# Patient Record
Sex: MALE | Race: WHITE | NOT HISPANIC OR LATINO | Employment: OTHER | URBAN - METROPOLITAN AREA
[De-identification: names, ages, dates, MRNs, and addresses within clinical notes are randomized per-mention and may not be internally consistent; named-entity substitution may affect disease eponyms.]

---

## 2023-02-08 PROBLEM — I65.29 CAROTID ATHEROSCLEROSIS: Status: ACTIVE | Noted: 2023-02-08

## 2023-02-08 PROBLEM — I63.9 THROMBOEMBOLIC STROKE (MULTI): Status: ACTIVE | Noted: 2023-02-08

## 2023-02-08 PROBLEM — T84.093A FAILED TOTAL LEFT KNEE REPLACEMENT (CMS-HCC): Status: ACTIVE | Noted: 2023-02-08

## 2023-02-08 PROBLEM — M54.9 CHRONIC BACK PAIN GREATER THAN 3 MONTHS DURATION: Status: ACTIVE | Noted: 2023-02-08

## 2023-02-08 PROBLEM — Q21.12 PFO (PATENT FORAMEN OVALE) (HHS-HCC): Status: ACTIVE | Noted: 2023-02-08

## 2023-02-08 PROBLEM — I10 ESSENTIAL HYPERTENSION: Status: ACTIVE | Noted: 2023-02-08

## 2023-02-08 PROBLEM — M51.9 LUMBOSACRAL DISC DISEASE: Status: ACTIVE | Noted: 2023-02-08

## 2023-02-08 PROBLEM — S39.012A STRAIN OF BACK: Status: ACTIVE | Noted: 2023-02-08

## 2023-02-08 PROBLEM — R97.20 ELEVATED PSA: Status: ACTIVE | Noted: 2023-02-08

## 2023-02-08 PROBLEM — M47.12 CERVICAL SPONDYLOSIS WITH MYELOPATHY: Status: ACTIVE | Noted: 2023-02-08

## 2023-02-08 PROBLEM — E65 CENTRAL OBESITY: Status: ACTIVE | Noted: 2023-02-08

## 2023-02-08 PROBLEM — H53.9 VISUAL DISTURBANCE: Status: ACTIVE | Noted: 2023-02-08

## 2023-02-08 PROBLEM — L73.9 ACUTE FOLLICULITIS: Status: ACTIVE | Noted: 2023-02-08

## 2023-02-08 PROBLEM — R42 INTERMITTENT LIGHTHEADEDNESS: Status: ACTIVE | Noted: 2023-02-08

## 2023-02-08 PROBLEM — G89.29 CHRONIC BACK PAIN GREATER THAN 3 MONTHS DURATION: Status: ACTIVE | Noted: 2023-02-08

## 2023-02-08 PROBLEM — H60.91 OTITIS EXTERNA OF RIGHT EAR: Status: ACTIVE | Noted: 2023-02-08

## 2023-02-08 PROBLEM — R00.2 PALPITATIONS: Status: ACTIVE | Noted: 2023-02-08

## 2023-02-08 PROBLEM — E55.9 VITAMIN D DEFICIENCY: Status: ACTIVE | Noted: 2023-02-08

## 2023-02-08 PROBLEM — E78.5 DYSLIPIDEMIA: Status: ACTIVE | Noted: 2023-02-08

## 2023-02-08 RX ORDER — CLOPIDOGREL BISULFATE 75 MG/1
1 TABLET ORAL DAILY
COMMUNITY
Start: 2021-11-12

## 2023-02-08 RX ORDER — NAPROXEN SODIUM 220 MG/1
1 TABLET, FILM COATED ORAL DAILY
COMMUNITY
Start: 2021-11-12 | End: 2024-03-20

## 2023-02-08 RX ORDER — MUPIROCIN 20 MG/G
OINTMENT TOPICAL 3 TIMES DAILY
COMMUNITY
Start: 2022-07-11

## 2023-02-08 RX ORDER — MECLIZINE HCL 12.5 MG 12.5 MG/1
1 TABLET ORAL
COMMUNITY
Start: 2022-07-11

## 2023-02-08 RX ORDER — ATORVASTATIN CALCIUM 80 MG/1
1 TABLET, FILM COATED ORAL NIGHTLY
COMMUNITY
Start: 2021-11-12 | End: 2023-12-20

## 2023-02-08 RX ORDER — TIZANIDINE 4 MG/1
TABLET ORAL EVERY 6 HOURS PRN
COMMUNITY
Start: 2022-06-14

## 2023-02-08 RX ORDER — CIPROFLOXACIN AND DEXAMETHASONE 3; 1 MG/ML; MG/ML
SUSPENSION/ DROPS AURICULAR (OTIC)
COMMUNITY
Start: 2022-06-14

## 2023-02-08 RX ORDER — VALSARTAN 80 MG/1
1 TABLET ORAL DAILY
COMMUNITY
Start: 2021-11-12 | End: 2023-03-15

## 2023-02-08 RX ORDER — PANTOPRAZOLE SODIUM 40 MG/1
TABLET, DELAYED RELEASE ORAL
COMMUNITY
Start: 2021-12-30

## 2023-02-08 RX ORDER — HYDROCODONE BITARTRATE AND ACETAMINOPHEN 5; 325 MG/1; MG/1
1 TABLET ORAL 2 TIMES DAILY
COMMUNITY

## 2023-02-08 RX ORDER — BUTALBITAL, ACETAMINOPHEN AND CAFFEINE 50; 325; 40 MG/1; MG/1; MG/1
1 TABLET ORAL EVERY 6 HOURS PRN
COMMUNITY
Start: 2013-04-24 | End: 2023-04-03 | Stop reason: SDUPTHER

## 2023-03-15 DIAGNOSIS — I10 ESSENTIAL (PRIMARY) HYPERTENSION: ICD-10-CM

## 2023-03-15 RX ORDER — VALSARTAN 80 MG/1
TABLET ORAL
Qty: 90 TABLET | Refills: 3 | Status: SHIPPED | OUTPATIENT
Start: 2023-03-15 | End: 2024-03-20

## 2023-04-01 RX ORDER — OXYCODONE HYDROCHLORIDE 5 MG/1
TABLET ORAL
COMMUNITY
Start: 2023-01-21

## 2023-04-01 RX ORDER — TRAMADOL HYDROCHLORIDE 50 MG/1
1 TABLET ORAL 2 TIMES DAILY
COMMUNITY
Start: 2023-03-03

## 2023-04-01 RX ORDER — TAMSULOSIN HYDROCHLORIDE 0.4 MG/1
CAPSULE ORAL
COMMUNITY
Start: 2023-03-08

## 2023-04-02 PROBLEM — E66.3 OVERWEIGHT WITH BODY MASS INDEX (BMI) OF 28 TO 28.9 IN ADULT: Status: ACTIVE | Noted: 2023-04-02

## 2023-04-03 ENCOUNTER — OFFICE VISIT (OUTPATIENT)
Dept: PRIMARY CARE | Facility: CLINIC | Age: 63
End: 2023-04-03
Payer: COMMERCIAL

## 2023-04-03 VITALS
HEART RATE: 68 BPM | HEIGHT: 72 IN | RESPIRATION RATE: 18 BRPM | BODY MASS INDEX: 29.66 KG/M2 | SYSTOLIC BLOOD PRESSURE: 140 MMHG | DIASTOLIC BLOOD PRESSURE: 82 MMHG | TEMPERATURE: 97.6 F | WEIGHT: 219 LBS | OXYGEN SATURATION: 97 %

## 2023-04-03 DIAGNOSIS — M54.9 CHRONIC BACK PAIN GREATER THAN 3 MONTHS DURATION: ICD-10-CM

## 2023-04-03 DIAGNOSIS — G43.001 MIGRAINE WITHOUT AURA AND WITH STATUS MIGRAINOSUS, NOT INTRACTABLE: Primary | ICD-10-CM

## 2023-04-03 DIAGNOSIS — G89.29 CHRONIC BACK PAIN GREATER THAN 3 MONTHS DURATION: ICD-10-CM

## 2023-04-03 DIAGNOSIS — E78.5 DYSLIPIDEMIA: ICD-10-CM

## 2023-04-03 DIAGNOSIS — I63.342 CEREBROVASCULAR ACCIDENT (CVA) DUE TO THROMBOSIS OF LEFT CEREBELLAR ARTERY (MULTI): ICD-10-CM

## 2023-04-03 DIAGNOSIS — I10 ESSENTIAL HYPERTENSION: ICD-10-CM

## 2023-04-03 LAB
ALANINE AMINOTRANSFERASE (SGPT) (U/L) IN SER/PLAS: 30 U/L (ref 10–52)
ALBUMIN (G/DL) IN SER/PLAS: 4.5 G/DL (ref 3.4–5)
ALKALINE PHOSPHATASE (U/L) IN SER/PLAS: 89 U/L (ref 33–136)
ANION GAP IN SER/PLAS: 11 MMOL/L (ref 10–20)
ASPARTATE AMINOTRANSFERASE (SGOT) (U/L) IN SER/PLAS: 22 U/L (ref 9–39)
BILIRUBIN TOTAL (MG/DL) IN SER/PLAS: 0.6 MG/DL (ref 0–1.2)
CALCIUM (MG/DL) IN SER/PLAS: 9.6 MG/DL (ref 8.6–10.6)
CARBON DIOXIDE, TOTAL (MMOL/L) IN SER/PLAS: 29 MMOL/L (ref 21–32)
CHLORIDE (MMOL/L) IN SER/PLAS: 105 MMOL/L (ref 98–107)
CHOLESTEROL (MG/DL) IN SER/PLAS: 128 MG/DL (ref 0–199)
CHOLESTEROL IN HDL (MG/DL) IN SER/PLAS: 47.4 MG/DL
CHOLESTEROL/HDL RATIO: 2.7
CREATININE (MG/DL) IN SER/PLAS: 0.89 MG/DL (ref 0.5–1.3)
ERYTHROCYTE DISTRIBUTION WIDTH (RATIO) BY AUTOMATED COUNT: 14.9 % (ref 11.5–14.5)
ERYTHROCYTE MEAN CORPUSCULAR HEMOGLOBIN CONCENTRATION (G/DL) BY AUTOMATED: 31.7 G/DL (ref 32–36)
ERYTHROCYTE MEAN CORPUSCULAR VOLUME (FL) BY AUTOMATED COUNT: 88 FL (ref 80–100)
ERYTHROCYTES (10*6/UL) IN BLOOD BY AUTOMATED COUNT: 4.81 X10E12/L (ref 4.5–5.9)
GFR MALE: >90 ML/MIN/1.73M2
GLUCOSE (MG/DL) IN SER/PLAS: 135 MG/DL (ref 74–99)
HEMATOCRIT (%) IN BLOOD BY AUTOMATED COUNT: 42.3 % (ref 41–52)
HEMOGLOBIN (G/DL) IN BLOOD: 13.4 G/DL (ref 13.5–17.5)
LDL: 51 MG/DL (ref 0–99)
LEUKOCYTES (10*3/UL) IN BLOOD BY AUTOMATED COUNT: 5.5 X10E9/L (ref 4.4–11.3)
NRBC (PER 100 WBCS) BY AUTOMATED COUNT: 0 /100 WBC (ref 0–0)
PLATELETS (10*3/UL) IN BLOOD AUTOMATED COUNT: 174 X10E9/L (ref 150–450)
POTASSIUM (MMOL/L) IN SER/PLAS: 4.5 MMOL/L (ref 3.5–5.3)
PROSTATE SPECIFIC ANTIGEN,SCREEN: 1.11 NG/ML (ref 0–4)
PROTEIN TOTAL: 7 G/DL (ref 6.4–8.2)
SODIUM (MMOL/L) IN SER/PLAS: 140 MMOL/L (ref 136–145)
THYROTROPIN (MIU/L) IN SER/PLAS BY DETECTION LIMIT <= 0.05 MIU/L: 2.21 MIU/L (ref 0.44–3.98)
TRIGLYCERIDE (MG/DL) IN SER/PLAS: 146 MG/DL (ref 0–149)
UREA NITROGEN (MG/DL) IN SER/PLAS: 17 MG/DL (ref 6–23)
VLDL: 29 MG/DL (ref 0–40)

## 2023-04-03 PROCEDURE — 80061 LIPID PANEL: CPT

## 2023-04-03 PROCEDURE — 1036F TOBACCO NON-USER: CPT | Performed by: FAMILY MEDICINE

## 2023-04-03 PROCEDURE — 85027 COMPLETE CBC AUTOMATED: CPT

## 2023-04-03 PROCEDURE — 84153 ASSAY OF PSA TOTAL: CPT

## 2023-04-03 PROCEDURE — 99214 OFFICE O/P EST MOD 30 MIN: CPT | Performed by: FAMILY MEDICINE

## 2023-04-03 PROCEDURE — 3077F SYST BP >= 140 MM HG: CPT | Performed by: FAMILY MEDICINE

## 2023-04-03 PROCEDURE — 80053 COMPREHEN METABOLIC PANEL: CPT

## 2023-04-03 PROCEDURE — 36415 COLL VENOUS BLD VENIPUNCTURE: CPT | Performed by: FAMILY MEDICINE

## 2023-04-03 PROCEDURE — 3079F DIAST BP 80-89 MM HG: CPT | Performed by: FAMILY MEDICINE

## 2023-04-03 PROCEDURE — 84443 ASSAY THYROID STIM HORMONE: CPT

## 2023-04-03 RX ORDER — BUTALBITAL, ACETAMINOPHEN AND CAFFEINE 50; 325; 40 MG/1; MG/1; MG/1
1 TABLET ORAL EVERY 6 HOURS PRN
Qty: 90 TABLET | Refills: 3 | Status: SHIPPED | OUTPATIENT
Start: 2023-04-03 | End: 2023-07-02

## 2023-04-03 ASSESSMENT — PAIN SCALES - GENERAL: PAINLEVEL: 6

## 2023-04-03 NOTE — PROGRESS NOTES
Subjective   Martin Benson is a 63 y.o. male who presents for Follow-up.  HPI  ; patient is a 63-year-old male who is being evaluated today for recheck on blood work, reevaluation of his migraine headaches and he also had a repeat surgery on his left total knee in order to replace a plastic sleeve that had broken in his total knee replacement.  Patient had his knee replaced probably 5 or 6 years ago and there was a plastic sleeve that had eroded and deteriorated and it had to be replaced in January.  He had surgery on January 9 and he does have good range of motion in his left knee but he states that it still is painful and he does go to pain management.  Patient also recently saw urology and was prescribed Flomax for his decreased urination.  He also does have some rectal bleeding from his hemorrhoids.  Patient was hoping to get a refill on his Fioricet for migraines and I will review his OARRS report, and he will sign the contract for e- consent on the computer.  Patient states he is officially retired from work and he did get his disability since he had a small stroke about 3 years ago.      Objective ROS ; 10 systems were reviewed and the information is included in the HPI and no additional review of systems is indicated.    Physical Exam  Vitals and nursing note reviewed.   Constitutional:       Appearance: Normal appearance. He is obese.      Comments: Patient has gained about 20 pounds since he retired but he is alert and oriented.  He is trying to diet and watch his weight.   HENT:      Head: Normocephalic.      Right Ear: Tympanic membrane and external ear normal.      Left Ear: Tympanic membrane and external ear normal.      Nose: Nose normal.      Mouth/Throat:      Mouth: Mucous membranes are moist.      Pharynx: Oropharynx is clear.   Eyes:      Extraocular Movements: Extraocular movements intact.      Conjunctiva/sclera: Conjunctivae normal.      Pupils: Pupils are equal, round, and reactive to  light.   Neck:      Comments: Patient has restriction to range of motion testing cervical spine due to DJD and spasm.  Cardiovascular:      Rate and Rhythm: Normal rate and regular rhythm.      Pulses: Normal pulses.      Heart sounds: Normal heart sounds.      Comments: Patient had surgery last year for a patent foraminal ovale and has no problems at this time.  Heart rhythm is stable S1 and S2 are noted, no ectopics.  Pulmonary:      Effort: Pulmonary effort is normal.      Comments: Lungs are clear to auscultation.    Abdominal:      General: Abdomen is flat. Bowel sounds are normal.      Palpations: Abdomen is soft.      Comments: Abdomen is soft and mildly obese but nontender, no hepatosplenomegaly.  Patient also had an upper endoscopy and colonoscopy this past October.   Genitourinary:     Comments: Not examined.  Patient saw urology last week and had a urologic checkup and he has also had a recent colonoscopy last October.  He does have some bleeding hemorrhoids.  Musculoskeletal:         General: Tenderness (Tenderness left knee since repeat surgery.) present. Normal range of motion.      Cervical back: Normal range of motion. Tenderness present.      Comments: Patient had repeat surgery on his failed , left total knee to replace the plastic sleeve that had eroded.  He does have good range of motion in the left knee but he has pain with movement.  He also has severe arthritis in his right knee and chronic lumbosacral disc disease.  Patient is in pain management.   Skin:     General: Skin is warm.   Neurological:      General: No focal deficit present.      Mental Status: He is alert and oriented to person, place, and time. Mental status is at baseline.      Comments: Previous small CVA 3 years ago and is now officially retired.  He does have some cervical neuritis into the shoulders from the neck.  He also has chronic lumbosacral disc disease with some radiculitis into the legs.  No focal deficits are noted  from the thrombotic CVA patient had 3 years ago.  Patient does get migraine headaches and Fioricet has helped over the past years that he has had migraines.  He will be refilled medication today.   Psychiatric:         Mood and Affect: Mood normal.         Behavior: Behavior normal.         Thought Content: Thought content normal.         Judgment: Judgment normal.      Comments: Normal mood and affect.  Normal behavior and judgment.     PLAN : Patient is a 63-year-old male who was evaluated for recheck on blood work, blood pressure and refill on migraine medication.  Patient had a repeat surgery on his left knee that had previously been replaced due to a plastic sleeve that needed to be reinserted.  The other one had worn out.  This was done January 9 and he is ambulating better but he still has some pain and discomfort in the knee.  He did complete physical therapy and follows up with the surgeon again tomorrow.  He also is in pain management.  He recently saw urology and was placed on Flomax due to BPH and some decrease in urinary flow.  I did renew his Fioricet for migraines, also reviewed his OARRS report and he did not give us an e-signature for the contract.  Patient otherwise will be notified of all his blood results in 4 days and further recommendations will be made at that time he will follow-up in 6 months or sooner as needed.    Problem List Items Addressed This Visit          Circulatory    Essential hypertension    Relevant Orders    CBC    Comprehensive Metabolic Panel    Lipid Panel    Prostate Specific Antigen, Screen    Thyroid Stimulating Hormone       Other    Chronic back pain greater than 3 months duration    Relevant Orders    CBC    Comprehensive Metabolic Panel    Lipid Panel    Prostate Specific Antigen, Screen    Thyroid Stimulating Hormone    Dyslipidemia    Relevant Orders    CBC    Comprehensive Metabolic Panel    Lipid Panel    Prostate Specific Antigen, Screen    Thyroid Stimulating  Hormone     Other Visit Diagnoses       Migraine without aura and with status migrainosus, not intractable    -  Primary    Relevant Medications    butalbital-acetaminophen-caff -40 mg tablet                 Stewart Moseley DO

## 2023-04-09 NOTE — RESULT ENCOUNTER NOTE
Cholesterol was normal at 128   triglycerides are normal at 146       blood sugar was up a little bit at 135    he has to watch his diet better               kidney and liver function are normal           red and white blood cell counts are stable         thyroid and prostate level are normal      just try to watch the sugar  a   little better otherwise blood work is stable .

## 2023-04-10 ENCOUNTER — TELEPHONE (OUTPATIENT)
Dept: PRIMARY CARE | Facility: CLINIC | Age: 63
End: 2023-04-10
Payer: COMMERCIAL

## 2023-04-10 NOTE — TELEPHONE ENCOUNTER
----- Message from Stewart Moseley DO sent at 4/9/2023  2:57 PM EDT -----  Cholesterol was normal at 128   triglycerides are normal at 146       blood sugar was up a little bit at 135    he has to watch his diet better               kidney and liver function are normal           red and white blood cell counts are stable         thyroid and prostate level are normal      just try to watch the sugar  a   little better otherwise blood work is stable .

## 2023-07-12 ENCOUNTER — OFFICE VISIT (OUTPATIENT)
Dept: PRIMARY CARE | Facility: CLINIC | Age: 63
End: 2023-07-12
Payer: COMMERCIAL

## 2023-07-12 VITALS
BODY MASS INDEX: 28.31 KG/M2 | WEIGHT: 209 LBS | OXYGEN SATURATION: 96 % | HEART RATE: 67 BPM | SYSTOLIC BLOOD PRESSURE: 118 MMHG | RESPIRATION RATE: 16 BRPM | HEIGHT: 72 IN | TEMPERATURE: 98 F | DIASTOLIC BLOOD PRESSURE: 74 MMHG

## 2023-07-12 DIAGNOSIS — Q21.12 PFO (PATENT FORAMEN OVALE) (HHS-HCC): ICD-10-CM

## 2023-07-12 DIAGNOSIS — M47.12 CERVICAL SPONDYLOSIS WITH MYELOPATHY: ICD-10-CM

## 2023-07-12 DIAGNOSIS — G89.29 CHRONIC BACK PAIN GREATER THAN 3 MONTHS DURATION: Primary | ICD-10-CM

## 2023-07-12 DIAGNOSIS — T84.093S FAILED TOTAL LEFT KNEE REPLACEMENT, SEQUELA: ICD-10-CM

## 2023-07-12 DIAGNOSIS — M45.9 RHEUMATOID ARTHRITIS INVOLVING VERTEBRA WITH POSITIVE RHEUMATOID FACTOR (MULTI): ICD-10-CM

## 2023-07-12 DIAGNOSIS — R42 INTERMITTENT LIGHTHEADEDNESS: ICD-10-CM

## 2023-07-12 DIAGNOSIS — I63.9 THROMBOEMBOLIC STROKE (MULTI): ICD-10-CM

## 2023-07-12 DIAGNOSIS — M51.9 LUMBOSACRAL DISC DISEASE: ICD-10-CM

## 2023-07-12 DIAGNOSIS — M54.9 CHRONIC BACK PAIN GREATER THAN 3 MONTHS DURATION: Primary | ICD-10-CM

## 2023-07-12 PROCEDURE — 3074F SYST BP LT 130 MM HG: CPT | Performed by: FAMILY MEDICINE

## 2023-07-12 PROCEDURE — 99214 OFFICE O/P EST MOD 30 MIN: CPT | Performed by: FAMILY MEDICINE

## 2023-07-12 PROCEDURE — 1036F TOBACCO NON-USER: CPT | Performed by: FAMILY MEDICINE

## 2023-07-12 PROCEDURE — 3078F DIAST BP <80 MM HG: CPT | Performed by: FAMILY MEDICINE

## 2023-07-12 ASSESSMENT — PAIN SCALES - GENERAL: PAINLEVEL: 6

## 2023-07-12 NOTE — PROGRESS NOTES
Subjective   Martin Benson is a 63 y.o. male who presents for Follow-up.      HPI  : Patient is a 63-year-old male who had a failed left knee total replacement several years ago and needed repair of the total knee close to a year ago.  Apparently there was a piece of the previous total knee that had worn out and it had to be replaced.  He is still in pain management and follows with Dr. Jiménez at [facility].  Patient also has arthritis in his right knee but does not want the knee replacement at this time.  Patient also had a patent foramen ovale repair of his heart a year ago and is stable from that surgery.  He denies any chest pain or shortness of breath.  Patient does have chronic lumbosacral back problems with radiculitis and he has had physical therapy and outpatient manipulation treatment for years.  He does not want any lumbar back surgery and tries to deal with the pain through pain management and stretching exercises.  Patient also has chronic cervical problems with cervical disc disease and cervical neuritis which is treated by pain management.      Objective   :  ROS  :10 systems were reviewed and the information is included in the HPI and no additional review of systems is indicated.    Physical Exam  Vitals and nursing note reviewed.   Constitutional:       Appearance: Normal appearance. He is normal weight.      Comments: Patient is alert and oriented in no acute distress.   HENT:      Head: Normocephalic.      Right Ear: Tympanic membrane and external ear normal.      Left Ear: Tympanic membrane and external ear normal.      Nose: Nose normal.      Mouth/Throat:      Mouth: Mucous membranes are moist.      Pharynx: Oropharynx is clear.      Comments: Mouth is moist, tongue is midline, no posterior pharyngeal erythema noted.  No thyromegaly.  Eyes:      Extraocular Movements: Extraocular movements intact.      Conjunctiva/sclera: Conjunctivae normal.      Pupils: Pupils are equal, round, and  reactive to light.      Comments: Patient denies blurred or double vision, does usually have a yearly eye exam.   Neck:      Comments:  patient has chronic cervical arthritis with cervical neuritis and restriction of motion.  He does follow with pain management for neck and low back problems.  Cardiovascular:      Rate and Rhythm: Normal rate and regular rhythm.      Pulses: Normal pulses.      Heart sounds: Normal heart sounds.      Comments: Patient does follow with cardiology after surgery for a patent foramen ovale.  He currently is stable and denies chest pain or palpitations.  Heart rhythm is stable S1 and S2 are noted, no ectopics.  Pulmonary:      Effort: Pulmonary effort is normal.      Comments: Lungs are clear to auscultation.  Patient denies coughing or congestion.  Abdominal:      General: Abdomen is flat. Bowel sounds are normal.      Palpations: Abdomen is soft.      Comments: Patient denies abdominal pain, no guarding or rebound tenderness.  Abdomen is soft and nontender, no hepatosplenomegaly.  Normal bowel sounds x4.   Genitourinary:     Comments: Patient denies flank pain, no dysuria, no hematuria, occasional nocturia.  Musculoskeletal:         General: Normal range of motion.      Cervical back: Normal range of motion.      Comments: History of left total knee replacement several years ago and due to a failed left total knee he had to have a part replaced which he thinks still gives him some pain and discomfort.  He also sees pain management for this and is cervical neck problems and lumbosacral disc problems.  He has had cervical and lumbar problems for many years and has degenerative disc disease and cervical neuritis with lumbosacral radiculitis.  He also has degenerative arthritis of his right knee and will probably need a right total knee replacement in the near future.  Patient also has a history of rheumatoid arthritis and only takes ibuprofen.  Hands and fingers have some deformity and  also trigger finger on the left hand fifth digit.   Skin:     General: Skin is warm.      Comments: Denies any skin lesions or bruising, no erythema and no skin rashes.   Neurological:      General: No focal deficit present.      Mental Status: He is alert and oriented to person, place, and time. Mental status is at baseline.      Comments: History of cervical neuritis and lumbosacral radiculitis.  Currently he is being treated by pain management and is stable.  No upper extremity muscle weakness.  Does have chronic low back pain which affects his ability to do strenuous work.  He also had the left total knee done x2 due to failure of the first knee replacement.  Patient does have a limp in  his gait and coordination is stable.   Psychiatric:         Mood and Affect: Mood normal.         Behavior: Behavior normal.         Thought Content: Thought content normal.         Judgment: Judgment normal.     PLAN  : Patient is a 63-year-old male who is evaluated for several problems and concerns and review of medication.  He is still seeing pain management and has some chronic problems with his neck and low back.  He also still has some discomfort after the second surgery on his left total knee replacement.  He is not taking any anti-inflammatory medicine at this time and I recommend that he try Aleve 1 tablet every 8 hours.  He will continue following with pain management and may need some spinal nerve blocks.  Blood pressure and other vitals are stable.  He does have rheumatoid arthritis in his hands and fingers and we will check his blood and rheumatoid factor next time he has an appointment in November.  He is on disability from his job and that is permanent disability and he has to wait until he is 65 for Medicare.  He does get pain medicine from pain management but he does need some type of anti-inflammatory so I think that Aleve might be the best choice.  We did discuss this and he does have some ibuprofen at home  which she will use up first and then further recommendations will be made at the next visit.    Problem List Items Addressed This Visit       Cervical spondylosis with myelopathy    Chronic back pain greater than 3 months duration - Primary    Failed total left knee replacement (CMS/HCC)    Intermittent lightheadedness    Lumbosacral disc disease    PFO (patent foramen ovale)    Thromboembolic stroke (CMS/HCC)    Rheumatoid arthritis involving vertebra with positive rheumatoid factor (CMS/HCC)            Stewart Moseley, DO

## 2023-12-20 ENCOUNTER — TELEPHONE (OUTPATIENT)
Dept: PRIMARY CARE | Facility: CLINIC | Age: 63
End: 2023-12-20
Payer: COMMERCIAL

## 2023-12-20 DIAGNOSIS — I65.29 OCCLUSION AND STENOSIS OF UNSPECIFIED CAROTID ARTERY: ICD-10-CM

## 2023-12-20 RX ORDER — ATORVASTATIN CALCIUM 80 MG/1
80 TABLET, FILM COATED ORAL DAILY
Qty: 90 TABLET | Refills: 3 | Status: SHIPPED | OUTPATIENT
Start: 2023-12-20

## 2024-01-03 ENCOUNTER — OFFICE VISIT (OUTPATIENT)
Dept: PRIMARY CARE | Facility: CLINIC | Age: 64
End: 2024-01-03
Payer: COMMERCIAL

## 2024-01-03 VITALS
HEART RATE: 94 BPM | TEMPERATURE: 98.1 F | RESPIRATION RATE: 16 BRPM | DIASTOLIC BLOOD PRESSURE: 81 MMHG | BODY MASS INDEX: 30.78 KG/M2 | WEIGHT: 215 LBS | HEIGHT: 70 IN | SYSTOLIC BLOOD PRESSURE: 126 MMHG | OXYGEN SATURATION: 97 %

## 2024-01-03 DIAGNOSIS — M51.9 LUMBOSACRAL DISC DISEASE: Primary | ICD-10-CM

## 2024-01-03 DIAGNOSIS — M54.9 CHRONIC BACK PAIN GREATER THAN 3 MONTHS DURATION: ICD-10-CM

## 2024-01-03 DIAGNOSIS — I63.9 THROMBOEMBOLIC STROKE (MULTI): ICD-10-CM

## 2024-01-03 DIAGNOSIS — G89.29 CHRONIC BACK PAIN GREATER THAN 3 MONTHS DURATION: ICD-10-CM

## 2024-01-03 DIAGNOSIS — R29.3 POSTURAL IMBALANCE: ICD-10-CM

## 2024-01-03 DIAGNOSIS — M50.30 DDD (DEGENERATIVE DISC DISEASE), CERVICAL: ICD-10-CM

## 2024-01-03 PROCEDURE — 3079F DIAST BP 80-89 MM HG: CPT | Performed by: FAMILY MEDICINE

## 2024-01-03 PROCEDURE — 1036F TOBACCO NON-USER: CPT | Performed by: FAMILY MEDICINE

## 2024-01-03 PROCEDURE — 3074F SYST BP LT 130 MM HG: CPT | Performed by: FAMILY MEDICINE

## 2024-01-03 PROCEDURE — 99214 OFFICE O/P EST MOD 30 MIN: CPT | Performed by: FAMILY MEDICINE

## 2024-01-03 RX ORDER — CELECOXIB 200 MG/1
200 CAPSULE ORAL DAILY
Qty: 30 CAPSULE | Refills: 5 | Status: SHIPPED | OUTPATIENT
Start: 2024-01-03 | End: 2024-04-03 | Stop reason: ALTCHOICE

## 2024-01-03 ASSESSMENT — PATIENT HEALTH QUESTIONNAIRE - PHQ9
1. LITTLE INTEREST OR PLEASURE IN DOING THINGS: NOT AT ALL
SUM OF ALL RESPONSES TO PHQ9 QUESTIONS 1 AND 2: 0
2. FEELING DOWN, DEPRESSED OR HOPELESS: NOT AT ALL

## 2024-01-03 ASSESSMENT — PAIN SCALES - GENERAL: PAINLEVEL: 0-NO PAIN

## 2024-01-03 NOTE — PROGRESS NOTES
Subjective   Martin Benson is a 63 y.o. male who presents for Follow-up (Patient here with complaint of numbness of right arm, elbow and fingers, also complains of severe joint pain).    HPI  : Patient is a 63-year-old male who is being evaluated today for several problems and concerns.  He has a previous total left knee replacement several years ago and then had a repeat surgery on the left knee to repair a tear in the structure  of the left knee replacement.  Patient also has recurrent lower back problems and degenerative disc disease which will need further evaluation with x-rays.  He has not had any x-rays in about 4 years and that will need to be reevaluated.  Patient also gets some numbness and tingling in his left hand, thumb and index finger.  He does have cervical degenerative disc disease as well and this could be cervical neuritis.  Also follows with cardiology after repair of a patent foramen ovale.  And he is doing well after that surgery.  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  Patient does follow with pain management and was asking about taking some type of anti-inflammatory medication.      Objective  : ROS : 10 systems were reviewed and the information is included in the HPI and no additional review of systems is indicated.    Physical Exam  Vitals and nursing note reviewed.   Constitutional:       Appearance: Normal appearance. He is normal weight.      Comments: Patient is alert and oriented x3.   No acute distress   HENT:      Head: Normocephalic.      Right Ear: Tympanic membrane and external ear normal.      Left Ear: Tympanic membrane and external ear normal.      Ears:      Comments: Ears are patent bilaterally and TMs are clear.     Nose: Nose normal.      Mouth/Throat:      Mouth: Mucous membranes are moist.      Pharynx: Oropharynx is clear.      Comments: Speech problems when stressed.  Tongue midline and throat clear.  Eyes:      Extraocular  Movements: Extraocular movements intact.      Conjunctiva/sclera: Conjunctivae normal.      Pupils: Pupils are equal, round, and reactive to light.      Comments: No visual disturbance, does have his eyes examined once a year.   Neck:      Comments: Cervical degenerative disc disease and cervical neuritis which will need further evaluation.  X-rays from several years ago showed cervical disc disease at C5-C6 and C6 and C7 with neuroforaminal narrowing at.  No carotid bruits, no thyromegaly, no cervical adenopathy.    Cardiovascular:      Rate and Rhythm: Normal rate and regular rhythm.      Pulses: Normal pulses.      Heart sounds: Normal heart sounds.      Comments: Patient had repair of a patent foramen ovale 2 years ago and is stable.  Patient denies chest pain and no palpitations.  Heart rhythm is stable S1 and S2 are noted, no ectopics.  Pulmonary:      Effort: Pulmonary effort is normal.      Breath sounds: Normal breath sounds.      Comments: Patient denies any coughing or wheezing.  Lungs are clear to auscultation.    Abdominal:      General: Bowel sounds are normal.      Palpations: Abdomen is soft.      Comments: Abdomen is soft and  mildly obese, and non tender. No flank tenderness.  No suprapubic pain.     Genitourinary:     Comments: Patient denies dysuria, no hematuria, no nocturia, denies flank pain.  Musculoskeletal:         General: Tenderness present.      Comments: Post total left knee replacement and revision.   Age-related arthritis in the joints. Restriction of motion , cervical and lumbar spines due to arthritis and  muscle spasm.  Patient will be sent for x-rays on his lumbosacral spine and he does have advanced degenerative disc disease.   Skin:     General: Skin is warm.      Comments: There is no bruising, no erythema, no skin lesions noted, no rashes.   Neurological:      Mental Status: He is alert and oriented to person, place, and time. Mental status is at baseline.      Sensory:  Sensory deficit present.      Comments: Patient does have a history of a thromboembolic stroke from approximately 3 years ago.  There is minimal if any residual deficits.  He does follow with neurology.  Paresthesias lower extremities from lumbosacral DJD.  Neuritis lower legs,.  Also has paresthesias in his left arm and index finger.  Indications of possible cervical neuritis.  This will also need further evaluation.   Psychiatric:         Behavior: Behavior normal.         Thought Content: Thought content normal.         Judgment: Judgment normal.      Comments: Patient has increased anxiety concerning his health problems.  He is worried about the increased problems with back pain which seems worse since his revision on his left knee replacement.  Thought content and judgment are stable.  No signs of vascular dementia.  Behavior is normal.     PLAN : Patient is a 63-year-old male who was evaluated today for several problems and concerns.  He had a revision of his previous left total knee replacement 6 or 8 months ago and still feels like it is healing.  He also has had increased back problems with lumbosacral radiculitis and paresthesias.  I did review his previous cervical and lumbar x-rays from 4 years ago and he does have a significant amount of DJD in the cervical and lumbar spines.  He will be sent for repeat x-rays of the lumbosacral region since he is concerned about a leg length discrepancy.  Further recommendations will be made after review of these more recent x-rays.  He also will be started on Celebrex 200 mg daily as an anti-inflammatory and hopefully this will also give him some relief.  He will follow-up in 3 weeks    Problem List Items Addressed This Visit    None           Stewart Moseley DO

## 2024-01-05 ENCOUNTER — HOSPITAL ENCOUNTER (OUTPATIENT)
Dept: RADIOLOGY | Facility: HOSPITAL | Age: 64
Discharge: HOME | End: 2024-01-05
Payer: COMMERCIAL

## 2024-01-05 DIAGNOSIS — R29.3 POSTURAL IMBALANCE: ICD-10-CM

## 2024-01-05 DIAGNOSIS — M51.9 LUMBOSACRAL DISC DISEASE: ICD-10-CM

## 2024-01-05 PROCEDURE — 72114 X-RAY EXAM L-S SPINE BENDING: CPT | Performed by: RADIOLOGY

## 2024-01-05 PROCEDURE — 72114 X-RAY EXAM L-S SPINE BENDING: CPT

## 2024-01-06 NOTE — RESULT ENCOUNTER NOTE
The x-rays on the lumbar spine show a moderate amount of arthritis at the lower spine at L4-L5 and L5-S1.    There is also disc narrowing and a mild curvature of the lower lumbar spine.    When he does come in for his follow-up and  I  we will show him the x-rays on the computer.

## 2024-01-24 ENCOUNTER — OFFICE VISIT (OUTPATIENT)
Dept: PRIMARY CARE | Facility: CLINIC | Age: 64
End: 2024-01-24
Payer: COMMERCIAL

## 2024-01-24 VITALS
WEIGHT: 214 LBS | OXYGEN SATURATION: 94 % | TEMPERATURE: 98.3 F | DIASTOLIC BLOOD PRESSURE: 83 MMHG | HEART RATE: 71 BPM | HEIGHT: 70 IN | RESPIRATION RATE: 16 BRPM | SYSTOLIC BLOOD PRESSURE: 126 MMHG | BODY MASS INDEX: 30.64 KG/M2

## 2024-01-24 DIAGNOSIS — I63.9 THROMBOEMBOLIC STROKE (MULTI): ICD-10-CM

## 2024-01-24 DIAGNOSIS — M51.9 LUMBOSACRAL DISC DISEASE: ICD-10-CM

## 2024-01-24 DIAGNOSIS — M54.9 CHRONIC BACK PAIN GREATER THAN 3 MONTHS DURATION: ICD-10-CM

## 2024-01-24 DIAGNOSIS — M47.817 DJD (DEGENERATIVE JOINT DISEASE), LUMBOSACRAL: Primary | ICD-10-CM

## 2024-01-24 DIAGNOSIS — G89.29 CHRONIC BACK PAIN GREATER THAN 3 MONTHS DURATION: ICD-10-CM

## 2024-01-24 PROCEDURE — 3079F DIAST BP 80-89 MM HG: CPT | Performed by: FAMILY MEDICINE

## 2024-01-24 PROCEDURE — 99214 OFFICE O/P EST MOD 30 MIN: CPT | Performed by: FAMILY MEDICINE

## 2024-01-24 PROCEDURE — 1036F TOBACCO NON-USER: CPT | Performed by: FAMILY MEDICINE

## 2024-01-24 PROCEDURE — 3074F SYST BP LT 130 MM HG: CPT | Performed by: FAMILY MEDICINE

## 2024-01-24 ASSESSMENT — PAIN SCALES - GENERAL: PAINLEVEL: 0-NO PAIN

## 2024-01-24 ASSESSMENT — PATIENT HEALTH QUESTIONNAIRE - PHQ9
SUM OF ALL RESPONSES TO PHQ9 QUESTIONS 1 AND 2: 0
1. LITTLE INTEREST OR PLEASURE IN DOING THINGS: NOT AT ALL
2. FEELING DOWN, DEPRESSED OR HOPELESS: NOT AT ALL

## 2024-01-24 NOTE — PROGRESS NOTES
Subjective   Martin Benson is a 63 y.o. male who presents for Follow-up (3wk follow up visit. ).  HPI  :   Patient is a 63 year old male in with lower back problems and he wants  to review his recent X rays.  Patient had x-rays of his lumbosacral spine 2 weeks ago and I will pull them up on the computer so we can review them.  He also had a stress test yesterday at [facility] with his cardiologist and he was hoping to review those results in the computer as well.  Patient had surgery last year to repair a patent foraminal ovale and everything has been stable since that surgery.      Objective   : ROS : 10 systems were reviewed and the information is included in the HPI and no additional review of systems is indicated.    Physical Exam  Vitals and nursing note reviewed.   Constitutional:       Appearance: Normal appearance.      Comments: Patient is alert and oriented x3.   No acute distress   HENT:      Head: Normocephalic.      Right Ear: Tympanic membrane and external ear normal.      Left Ear: Tympanic membrane and external ear normal.      Ears:      Comments: Ears are patent bilaterally and TMs are clear.     Nose: Nose normal.      Mouth/Throat:      Mouth: Mucous membranes are moist.      Pharynx: Oropharynx is clear.      Comments: Mouth is moist, tongue is midline.  No posterior pharyngeal erythema.  Eyes:      Extraocular Movements: Extraocular movements intact.      Conjunctiva/sclera: Conjunctivae normal.      Pupils: Pupils are equal, round, and reactive to light.      Comments: No visual disturbance, does have his  eyes examined once a year.   Neck:      Comments: History of cervical degenerative disc disease and cervical neuritis.  No carotid bruits, no thyromegaly, no cervical adenopathy.  Neck spasm and restriction of motion secondary to arthritis,  stress and tension.  Cardiovascular:      Rate and Rhythm: Normal rate and regular rhythm.      Pulses: Normal pulses.      Heart sounds:  Normal heart sounds.      Comments: Patient had surgery last year for repeat foramen ovale and is stable.  He did have a stress echocardiogram yesterday with his cardiologist.  Patient denies chest pain and no palpitations.  Heart rhythm is stable S1 and S2 are noted, no ectopics.  Pulmonary:      Effort: Pulmonary effort is normal.      Breath sounds: Normal breath sounds.      Comments: Patient denies any coughing or wheezing.  Lungs are clear to auscultation.    Abdominal:      General: Bowel sounds are normal.      Palpations: Abdomen is soft.      Comments: Abdomen is soft and nontender, no hepatosplenomegaly.  No flank tenderness.  No suprapubic pain.  Positive bowel sounds x4.  No abdominal guarding and no rebound tenderness.   Genitourinary:     Comments: Patient does get some inguinal pain from his low back problems.  Also radiation of pain to the buttocks.  Patient denies dysuria, no hematuria, no nocturia, denies flank pain.  Musculoskeletal:         General: Tenderness present.      Cervical back: Normal range of motion. Tenderness present.      Comments: Restricted range of motion lumbar spine due to degenerative disc disease at L4-L5 and L5-S1.  Also gets radicular pain into the buttocks and groin area.  Age-related arthritis in the joints.  Restriction of motion cervical and lumbar spines due to degenerative disc disease and muscle spasm.  There is also a mild curvature or mild scoliosis in the thoracic / lumbar region.  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  He currently is stable.   Skin:     General: Skin is warm and dry.      Comments: There is no bruising, no erythema, no skin lesions noted, no rashes.   Neurological:      General: No focal deficit present.      Mental Status: He is alert and oriented to person, place, and time. Mental status is at baseline.      Sensory: Sensory deficit present.      Comments: Patient does have sciatica pain and  lumbosacral radicular pain into the buttocks.  He does get some paresthesias from his neck arthritis and also lower back arthritis.  He will be sent for physical therapy and possible traction treatment.    Muscle strength in the upper and lower extremities is stable.  Sometimes his coordination is off due to low back pain.   Psychiatric:         Behavior: Behavior normal.         Thought Content: Thought content normal.         Judgment: Judgment normal.      Comments: Patient has anxiety and flat affect due to his lower back problems.  Thought content and judgment are stable.  No signs of vascular dementia.  Behavior is normal.     PLAN : Patient was evaluated today with lower back problems and I did review his x-rays with him.  He has degenerative disc problems at L4-L5 and L5-S1.  There is also some foraminal narrowing and physical therapy may help.  He was given a referral to physical therapy and hopefully he can get some traction treatment.  He also needs stretching exercises and lumbosacral back strengthening.  He did agree with this approach and he will try to start some therapy and see if he can get traction as well.  He will return for follow-up after the therapy is completed otherwise he is stable and I also reviewed his stress echo from yesterday.  Patient will follow-up after his therapy is completed.    Problem List Items Addressed This Visit    None           Stewart Moseley, DO

## 2024-02-02 ENCOUNTER — EVALUATION (OUTPATIENT)
Dept: PHYSICAL THERAPY | Facility: CLINIC | Age: 64
End: 2024-02-02
Payer: COMMERCIAL

## 2024-02-02 DIAGNOSIS — M47.817 DJD (DEGENERATIVE JOINT DISEASE), LUMBOSACRAL: ICD-10-CM

## 2024-02-02 DIAGNOSIS — M51.9 LUMBOSACRAL DISC DISEASE: Primary | ICD-10-CM

## 2024-02-02 PROCEDURE — 97161 PT EVAL LOW COMPLEX 20 MIN: CPT | Mod: GP

## 2024-02-02 PROCEDURE — 97530 THERAPEUTIC ACTIVITIES: CPT | Mod: GP

## 2024-02-02 ASSESSMENT — ENCOUNTER SYMPTOMS
LOSS OF SENSATION IN FEET: 0
DEPRESSION: 0
OCCASIONAL FEELINGS OF UNSTEADINESS: 0

## 2024-02-02 ASSESSMENT — COLUMBIA-SUICIDE SEVERITY RATING SCALE - C-SSRS
2. HAVE YOU ACTUALLY HAD ANY THOUGHTS OF KILLING YOURSELF?: NO
6. HAVE YOU EVER DONE ANYTHING, STARTED TO DO ANYTHING, OR PREPARED TO DO ANYTHING TO END YOUR LIFE?: NO
1. IN THE PAST MONTH, HAVE YOU WISHED YOU WERE DEAD OR WISHED YOU COULD GO TO SLEEP AND NOT WAKE UP?: NO
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
## 2024-02-07 ENCOUNTER — TREATMENT (OUTPATIENT)
Dept: PHYSICAL THERAPY | Facility: CLINIC | Age: 64
End: 2024-02-07
Payer: COMMERCIAL

## 2024-02-07 DIAGNOSIS — M47.817 DJD (DEGENERATIVE JOINT DISEASE), LUMBOSACRAL: ICD-10-CM

## 2024-02-07 DIAGNOSIS — M51.9 LUMBOSACRAL DISC DISEASE: Primary | ICD-10-CM

## 2024-02-07 PROCEDURE — 97112 NEUROMUSCULAR REEDUCATION: CPT | Mod: GP,CQ

## 2024-02-07 PROCEDURE — 97012 MECHANICAL TRACTION THERAPY: CPT | Mod: GP,CQ
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
## 2024-02-09 ENCOUNTER — TREATMENT (OUTPATIENT)
Dept: PHYSICAL THERAPY | Facility: CLINIC | Age: 64
End: 2024-02-09
Payer: COMMERCIAL

## 2024-02-09 DIAGNOSIS — M51.9 LUMBOSACRAL DISC DISEASE: ICD-10-CM

## 2024-02-09 DIAGNOSIS — M47.817 DJD (DEGENERATIVE JOINT DISEASE), LUMBOSACRAL: ICD-10-CM

## 2024-02-09 PROCEDURE — 97113 AQUATIC THERAPY/EXERCISES: CPT | Mod: GP

## 2024-02-09 NOTE — PROGRESS NOTES
Physical Therapy  Physical Therapy Progress Note    Patient Name Martin Benson   MRN: [id number]  Today's Date: 02/09/24  Time Calculation  Start Time: 0705  Stop Time: 0750  Time Calculation (min): 45 min    Insurance:    (per information provided by  pre-cert team)  Visit #: 2  Approval required:  N  # of visits approved:  60    Therapy Diagnoses:   1. Lumbosacral disc disease  Follow Up In Physical Therapy      2. DJD (degenerative joint disease), lumbosacral  Follow Up In Physical Therapy        General:  Reason for visit: chronic DDD   Referred by: Lorelei Hernandez MD appt:  4/3/2024  Preferred Name:  Sameer  Script:  PT eval and treat, lumbar traction  Onset Date:  1/24/24      Assessment:  Patient tolerated treatment well with initiation of aquatics exercises.  Patient needs continued work on/skilled PT for: progressing land/aqua ex and continuing traction to address remaining functional, objective and subjective deficits to allow them to return to prior /optimal level of function with ADLs.  Patient is progressing with goals: increased knowledge of ex and posture  Skilled care:  Supervised aquatics    Plan:    Continue to progress per poc:   NV add increased pull on traction and additional land ex    Subjective:   Patient reports:  he had decreased symptoms temporarily after traction    Have you fallen since last visit:  no    Precautions: low fall risk; leg gives out some times    Pain:  5/10  Location/Type of pain:  sharp and irritating in low back    HEP compliance/understanding:  yes    Objective:   Objective Measurements:    Function:   Function remains the same as initially  Posture: Needed postural cues in water to educate patient in proper posture for core ex and to keep shoulders back  Gait:  Balance: no loss of balance in pool against C=12  ROM:    Strength:  Flexibility:      Treatment:   **= HEP  NV= Next visit  np= not performed  nb= non-billable  G= group HEP= discharged to [facility]    Aquatics:  "         45 minutes  TM= Treadmill, T= Treadmill speed, C=Current, BTW = Back to Wall, NV = Next Visit, NP = Not Performed, G = Group, NB = non-billable     Stair stretch HS/HF, 45\" x 1 ea, R/L    C=12 TM Fwd, T=8 x 5'  C=12 TM Rev, T=4 x 5'  Lateral ambulation with wall x 4 laps    C=12, Facing C, HR/TR with barbell/wall support x 20 NV  Marching with barbell/wall support x 10 NV  3 way hip AROM with barbell/wall support x 10 each NV  Partial Squats with barbell/wall support x 10 NV  WBOS noodle press downs 2”/10 NV  WBOS active trunk rotation with noodle support 5”/10 each direction NV    BTW abdominal draw in 5”/10  BTW DLS open Aquacisor push/pull, up/down x 10 each    BTW Dynamic Hamstring Stretch x 10 jp NV  BTW DLS BUE open paddles flex/ext, ABD/ADD 2 x 10 NV  BTW DLS open paddles horizontal ADD/ABD 2 x 10 NV  BTW SKTC 20\" hold x 2 jp NV  BTW Piriformis stretch 20\" x 2 jp NV    Deep End with noodle:  Bicycle fwd 3 minutes  XC ski, jumping jacks x 2 minute each  Vertical hang x 2 minutes NV        "

## 2024-02-14 ENCOUNTER — TREATMENT (OUTPATIENT)
Dept: PHYSICAL THERAPY | Facility: CLINIC | Age: 64
End: 2024-02-14
Payer: COMMERCIAL

## 2024-02-14 DIAGNOSIS — M47.817 DJD (DEGENERATIVE JOINT DISEASE), LUMBOSACRAL: Primary | ICD-10-CM

## 2024-02-14 DIAGNOSIS — M51.9 LUMBOSACRAL DISC DISEASE: ICD-10-CM

## 2024-02-14 PROCEDURE — 97012 MECHANICAL TRACTION THERAPY: CPT | Mod: GP

## 2024-02-14 PROCEDURE — 97112 NEUROMUSCULAR REEDUCATION: CPT | Mod: GP

## 2024-02-14 NOTE — PROGRESS NOTES
"   Physical Therapy  Physical Therapy Progress Note    Patient Name Martin Benson   MRN: [id number]  Today's Date: 02/14/24  Time Calculation  Start Time: 0850  Stop Time: 0941  Time Calculation (min): 51 min    Insurance:    (per information provided by  pre-cert team)  Visit #: 4  Approval required:  N  # of visits approved:  60    Therapy Diagnoses:   1. DJD (degenerative joint disease), lumbosacral        2. Lumbosacral disc disease          General:  Reason for visit: chronic DDD   Referred by: Lorelei Hernandez MD appt:  4/3/2024  Preferred Name:  Sameer  Script:  PT eval and treat, lumbar traction  Onset Date:  1/24/24    Assessment:  Patient tolerated treatment well, did well with progression of supine core ex this date.    Patient needs continued work on/skilled PT for: core/DLS both in water and land to address remaining functional, objective and subjective deficits to allow them to return to prior /optimal level of function with ADLs.  Patient is progressing with goals: yes, better posture awareness  Skilled care:  supervised TE    Plan:    Continue to progress per poc:   NV add standing DLS ex with bands    Subjective:   Patient reports:  that he felt \"good\" after traction    Have you fallen since last visit:  no    Precautions: no fall risk  Pain:  5/10  Location/Type of pain:  LB, and left LE, especially left knee, \"blunt/sharp\"    HEP compliance/understanding:  yes    Objective:   Objective Measurements:    Function:     Posture:   Gait:  Balance:   ROM:    Strength: Pt demonstrates good core activation and sustains with basic supine core ex. Needs cues to avoid holding breath  Flexibility:      Treatment:   **= HEP  NV= Next visit  np= not performed  nb= non-billable  G= group HEP= discharged to [facility]    Neuromuscular Re-education:    26 minutes  TA activation 5\" hold x 15  TA activation 5\" with isometric hip adduction using ball x 20  TA activation with clams, black TB , x 20  Bridges 5\" hold x 10  Iso " "hip flexion, 5\", 10, R/L    Modalities:       Vasopneumatic Device       minutes  Electrical Stimulation          minutes  Ultrasound            minutes  Iontophoresis                     minutes  Cold Pack            minutes  Mechanical Traction         15 minutes  95# /75#, Intermittent, 45\"/25\" x 15 min (Lumbar)    HEAccess Code: [email or web address]  URL: [email or web address]  Date: 02/14/2024  Prepared by: Maranda Sanchez=    Exercises  - Supine Transversus Abdominis Bracing - Hands on Stomach  - 1-2 x daily - 7 x weekly - 1-2 sets - 10 reps - 5-10 count hold  - Hooklying Isometric Hip Flexion  - 1-2 x daily - 7 x weekly - 1-2 sets - 10 reps - 5 count hold  - Supine Hip Adduction Isometric with Ball  - 1-2 x daily - 7 x weekly - 1-2 sets - 10 reps - 5-10 count hold  - Hooklying Clamshell with Resistance  - 1-2 x daily - 7 x weekly - 2 sets - 10 reps - 3-5 count hold  - Supine Bridge  - 1-2 x daily - 7 x weekly - 1-2 sets - 10 reps - 5 count hold  - Seated Hamstring Stretch with Chair  - 1-2 x daily - 7 x weekly - 1 sets - 3 reps - 20-30 seconds holdP Progression:  Issued HEP for core ex.      "

## 2024-02-15 ENCOUNTER — OFFICE VISIT (OUTPATIENT)
Dept: ORTHOPEDIC SURGERY | Facility: CLINIC | Age: 64
End: 2024-02-15
Payer: COMMERCIAL

## 2024-02-15 ENCOUNTER — HOSPITAL ENCOUNTER (OUTPATIENT)
Dept: RADIOLOGY | Facility: CLINIC | Age: 64
Discharge: HOME | End: 2024-02-15
Payer: COMMERCIAL

## 2024-02-15 VITALS — HEIGHT: 70 IN | BODY MASS INDEX: 30.49 KG/M2 | WEIGHT: 213 LBS

## 2024-02-15 DIAGNOSIS — M25.562 LEFT KNEE PAIN, UNSPECIFIED CHRONICITY: ICD-10-CM

## 2024-02-15 PROCEDURE — 73562 X-RAY EXAM OF KNEE 3: CPT | Mod: LEFT SIDE | Performed by: RADIOLOGY

## 2024-02-15 PROCEDURE — L1812 KO ELASTIC W/JOINTS PRE OTS: HCPCS | Performed by: ORTHOPAEDIC SURGERY

## 2024-02-15 PROCEDURE — 1036F TOBACCO NON-USER: CPT | Performed by: ORTHOPAEDIC SURGERY

## 2024-02-15 PROCEDURE — 73562 X-RAY EXAM OF KNEE 3: CPT | Mod: LT

## 2024-02-15 ASSESSMENT — PAIN SCALES - GENERAL: PAINLEVEL: 5

## 2024-02-16 ENCOUNTER — TREATMENT (OUTPATIENT)
Dept: PHYSICAL THERAPY | Facility: CLINIC | Age: 64
End: 2024-02-16
Payer: COMMERCIAL

## 2024-02-16 DIAGNOSIS — M47.817 DJD (DEGENERATIVE JOINT DISEASE), LUMBOSACRAL: ICD-10-CM

## 2024-02-16 DIAGNOSIS — M51.9 LUMBOSACRAL DISC DISEASE: ICD-10-CM

## 2024-02-16 PROCEDURE — 97113 AQUATIC THERAPY/EXERCISES: CPT | Mod: GP

## 2024-02-16 NOTE — PROGRESS NOTES
Physical Therapy  Physical Therapy Progress Note    Patient Name Martin Benson   MRN: [id number]  Today's Date: 02/18/24  Time Calculation  Start Time: 0745  Stop Time: 0830  Time Calculation (min): 45 min    Insurance:    (per information provided by  pre-cert team)  Visit #: 5  Approval required:  N  # of visits approved:  60    Therapy Diagnoses:   1. Lumbosacral disc disease  Follow Up In Physical Therapy      2. DJD (degenerative joint disease), lumbosacral  Follow Up In Physical Therapy        General:  Reason for visit: chronic DDD   Referred by: Lorelei Hernandez MD appt:  4/3/2024  Preferred Name:  Sameer  Script:  PT eval and treat, lumbar traction  Onset Date:  1/24/24    Assessment:  Patient tolerated treatment well, did well with ex and increased current  Patient needs continued work on/skilled PT for: core and LE strengthening to address remaining functional, objective and subjective deficits to allow them to return to prior /optimal level of function with ADLs.  Patient is progressing with goals: yes  Skilled care:  Supervised aquatics    Plan:    Continue to progress per poc: with land and aquatics  NV add increased DLS with land based ex    Subjective:   Patient reports:  overall he is doing well, he noted increased discomfort after lumbar traction last visit compared to first time. He's doing well now    Have you fallen since last visit:  no    Precautions: no fall risk    Pain:  3-4 /10 LB pain, and 5 left knee pain (left medial knee, inferior patella, and lateral calf)  Location/Type of pain:  blunt/sharp    HEP compliance/understanding:  yes    Objective:   Objective Measurements:    Function:     Posture: verbal cues required at times for abdominal engagement with DLS ex as patient loses optimal posture at times  Gait:  Balance:   ROM:    Strength:  Flexibility:      Treatment:   **= HEP  NV= Next visit  np= not performed  nb= non-billable  G= group HEP= discharged to [facility]      Aquatics:       "      minutes  TM= Treadmill, T= Treadmill speed, C=Current, BTW = Back to Wall, NV = Next Visit, NP = Not Performed, G = Group, NB = non-billable     Stair stretch HS/HF, 45\" x 1 ea, R/L    C=12 TM Fwd, T=8 x 5'  C=12 TM Rev, T=4 x 5'  C=12 Lateral ambulation closed paddles x 4 laps   C=12, Facing C, HR/TR with barbell/wall support x 20 NV    C=12, Marching 4 ways with closed paddles alternate flex/ext x 10   3 way hip AROM with barbell/wall support x 10 each   C=12 Partial Squats in TM, 2 x 10    WBOS noodle press downs 2”/10 NV  WBOS active trunk rotation with noodle support 5”/10 each direction NV    BTW abdominal draw in 5”/10  BTW DLS open Aquacisor push/pull, up/down x 10 each    BTW SKTC 20\" hold x 2 jp   BTW Dynamic Hamstring Stretch x 10 jp NV  BTW DLS BUE open paddles flex/ext, ABD/ADD 2 x 10 NV  BTW DLS open paddles horizontal ADD/ABD 2 x 10 NV  BTW Piriformis stretch 20\" x 2 jp NV    Deep End with noodle:  Bicycle fwd 3 minutes  XC ski, jumping jacks x 2 minute each  Vertical hang x 2 minutes NV      "

## 2024-02-21 ENCOUNTER — TREATMENT (OUTPATIENT)
Dept: PHYSICAL THERAPY | Facility: CLINIC | Age: 64
End: 2024-02-21
Payer: COMMERCIAL

## 2024-02-21 DIAGNOSIS — M51.9 LUMBOSACRAL DISC DISEASE: Primary | ICD-10-CM

## 2024-02-21 DIAGNOSIS — M47.817 DJD (DEGENERATIVE JOINT DISEASE), LUMBOSACRAL: ICD-10-CM

## 2024-02-21 PROCEDURE — 97112 NEUROMUSCULAR REEDUCATION: CPT | Mod: GP,CQ

## 2024-02-21 PROCEDURE — 97012 MECHANICAL TRACTION THERAPY: CPT | Mod: GP,CQ

## 2024-02-21 NOTE — PROGRESS NOTES
Physical Therapy  Physical Therapy Progress Note    Patient Name Martin Benson   MRN: [id number]  Today's Date: 02/21/24    Time Calculation  Start Time: 1047  Stop Time: 1140  Time Calculation (min): 53 min    Insurance:    (per information provided by  pre-cert team)  Visit #: 6  Approval required:  N  # of visits approved:  60    Therapy Diagnoses:   1. Lumbosacral disc disease        2. DJD (degenerative joint disease), lumbosacral            General:  Reason for visit: chronic DDD   Referred by: Lorelei Hernandez MD appt:  4/3/2024  Preferred Name:  Sameer  Script:  PT eval and treat, lumbar traction  Onset Date:  1/24/24    Assessment:  Patient able to tolerate supine DLS exercises without increased symptoms this date.  Demonstrated control with techniques.  V/c for maintaining core engagement with exercises.  Continued relief post mechanical traction.  Mild dizziness reported afterwards despite slow transitioning with segmented positioning into seated, resting awhile at each change of position.  Dizziness subsided with seated rest.  Positive response post session.    Patient needs continue work on/skilled PT for: core and LE strengthening to address remaining functional, objective and subjective deficits to allow them to return to prior /optimal level of function with ADLs.  Patient is progressing with goals: yes  Skilled care:  NMR, patient education, mechanical traction    Plan:    Continue to progress per poc: with land and aquatics  NV add increased DLS with land based ex    Subjective:   Patient reports:  improved symptoms since beginning mechanical traction and aquatic therapy.  Reports increased rib cage pain after increased resistance with traction last visit.     Have you fallen since last visit:  no    Precautions: no fall risk    Pain:  3-4 /10 LB pain, and 5 left knee pain (left medial knee, inferior patella, and lateral calf)  Location/Type of pain:  blunt/sharp    HEP compliance/understanding:  " yes    Objective:   Objective Measurements:    Function:     Posture: verbal cues required at times for abdominal engagement with DLS ex as patient loses optimal posture at times    Treatment:   **= HEP  NV= Next visit  np= not performed  nb= non-billable  G= group HEP= discharged to [facility]    NMR:  20 minutes  TA activation 5\" hold x 20  TA activation 5\" with isometric hip adduction using ball x 20  TA activation with clams, black TB , x 20  S/l clamshells Black TB x 20 jp  Bridges 5\" hold x 15  Iso hip flexion, 5\", x 10, R/L    Mechanical Traction         15 minutes ( 5 minutes set up)  85# /75#, Intermittent, 45\"/15\" x 15 min (Lumbar)    "

## 2024-02-23 ENCOUNTER — TREATMENT (OUTPATIENT)
Dept: PHYSICAL THERAPY | Facility: CLINIC | Age: 64
End: 2024-02-23
Payer: COMMERCIAL

## 2024-02-23 DIAGNOSIS — M51.9 LUMBOSACRAL DISC DISEASE: ICD-10-CM

## 2024-02-23 DIAGNOSIS — M47.817 DJD (DEGENERATIVE JOINT DISEASE), LUMBOSACRAL: ICD-10-CM

## 2024-02-23 PROCEDURE — 97113 AQUATIC THERAPY/EXERCISES: CPT | Mod: GP

## 2024-02-23 NOTE — PROGRESS NOTES
Physical Therapy  Physical Therapy Progress Note    Patient Name Martin Benson   MRN: [id number]  Today's Date: 02/23/24  Time Calculation  Start Time: 0920  Stop Time: 1020  Time Calculation (min): 60 min    Insurance:    (per information provided by  pre-cert team)  Visit #: 7  Approval required:  N  # of visits approved:  60    Therapy Diagnoses:   1. Lumbosacral disc disease  Follow Up In Physical Therapy      2. DJD (degenerative joint disease), lumbosacral  Follow Up In Physical Therapy        General:  Reason for visit: chronic DDD   Referred by: Lorelei Hernandez MD appt:  4/3/2024  Preferred Name:  Sameer  Script:  PT eval and treat, lumbar traction  Onset Date:  1/24/24    Assessment:  Patient tolerated treatment well, challenged with coordination of marching and alternate arm movements in the water.   Patient needs continued work on/skilled PT for: increased land and aquatics based ex to address remaining functional, objective and subjective deficits to allow them to return to prior /optimal level of function with ADLs.  Patient is progressing with goals: improved mobility  Skilled care:  Supervised aquatics    Plan:    Continue to progress per poc:   NV add PB supine core/AROM ex    Subjective:   Patient reports:  He notices improved mobility, stability in gait since getting massages and having PT    Have you fallen since last visit:  no    Precautions: no fall risk    Pain:  3/10 LS region and 5 to left med knee and leg  Location/Type of pain:  blunt/sharp    HEP compliance/understanding:  yes    Objective:   Objective Measurements:    Function:   Improved stability in gait when walking longer distances, especially  Posture: Decreased coordination with marching and UE movements against current.   Gait:  Balance:   ROM:    Strength:  Flexibility:      Treatment:   **= HEP  NV= Next visit  np= not performed  nb= non-billable  G= group HEP= discharged to [facility]    Aquatics:          55 minutes  TM=  "Treadmill, T= Treadmill speed, C=Current, BTW = Back to Wall, NV = Next Visit, NP = Not Performed, G = Group, NB = non-billable     Stair stretch HS/HF, 45\" x 1 ea, R/L    C=12 TM Fwd, T=12 x 5'  C=12 TM Rev, T=5 x 5'  C=12 Lateral ambulation closed paddles abd x 4 laps   C=12, Facing C, HR/TR with barbell/wall support x 20     C=12, Marching 4 ways with closed paddles alternate flex/ext x 10   C=12 3 way hip AROM and HS curls with TM support x 15 each   C=12 Partial Squats in TM, 2 x 10    WBOS noodle press downs 2”/10 NV  WBOS active trunk rotation with noodle support 5”/10 each direction NV    BTW abdominal draw in 5”/10  BTW DLS open Aquacisor push/pull, up/down x 10 each  BTW SKTC 10\" hold x 2 jp     BTW Dynamic Hamstring Stretch x 10 jp NV  BTW DLS BUE open paddles flex/ext, ABD/ADD 2 x 10 NV  BTW DLS open paddles horizontal ADD/ABD 2 x 10 NV  BTW Piriformis stretch 20\" x 2 jp NV    Deep End with noodle:  Bicycle fwd 3 minutes  XC ski, jumping jacks x 2 minute each  Vertical hang x 2 minutes       "

## 2024-02-26 ENCOUNTER — TREATMENT (OUTPATIENT)
Dept: PHYSICAL THERAPY | Facility: CLINIC | Age: 64
End: 2024-02-26
Payer: COMMERCIAL

## 2024-02-26 DIAGNOSIS — M51.9 LUMBOSACRAL DISC DISEASE: ICD-10-CM

## 2024-02-26 DIAGNOSIS — M47.817 DJD (DEGENERATIVE JOINT DISEASE), LUMBOSACRAL: ICD-10-CM

## 2024-02-26 PROCEDURE — 97113 AQUATIC THERAPY/EXERCISES: CPT | Mod: GP

## 2024-02-26 NOTE — PROGRESS NOTES
Physical Therapy  Physical Therapy Progress Note    Patient Name Martin Benson   MRN: [id number]  Today's Date: 02/26/24  Time Calculation  Start Time: 0845  Stop Time: 0940  Time Calculation (min): 55 min    Insurance:    (per information provided by  pre-cert team)  Visit #: 8  Approval required:  N  # of visits approved:  60    Therapy Diagnoses:   1. Lumbosacral disc disease  Follow Up In Physical Therapy      2. DJD (degenerative joint disease), lumbosacral  Follow Up In Physical Therapy        General:  Reason for visit: chronic DDD   Referred by: Lorelei Hernandez MD appt:  4/3/2024  Preferred Name:  Sameer  Script:  PT eval and treat, lumbar traction  Onset Date:  1/24/24    Assessment:  Patient tolerated treatment well, did well with progression this date.  Had trouble initially with doing DLS ex against current, but then figured out how to position center of gravity and his posture improved  Patient needs continued work on/skilled PT for: dynamic postural stabilization to address remaining functional, objective and subjective deficits to allow them to return to prior /optimal level of function with ADLs.  Patient is progressing with goals: improved core stability  Skilled care: Supervised aquatics    Plan:    Continue to progress per poc:   NV progress land based exercise    Subjective:   Patient reports: Same pain location and intensity    Have you fallen since last visit:  no    Precautions: no fall risk    Pain:  3/10 LS region and 5 to left med knee and leg  Location/Type of pain:  blunt/sharp    HEP compliance/understanding:  Yes    Objective:   Objective Measurements:    Function:     Posture: Challenged with DLS ex against the stronger current until shifting center of gravity and then he did well with stabilization.  Gait:  Balance:   ROM:    Strength:  Flexibility:      Treatment:   **= HEP  NV= Next visit  np= not performed  nb= non-billable  G= group HEP= discharged to [facility]    Aquatics:           "55 minutes  TM= Treadmill, T= Treadmill speed, C=Current, BTW = Back to Wall, NV = Next Visit, NP = Not Performed, G = Group, NB = non-billable     Stair stretch HS/HF, 45\" x 1 ea, R/L    C=12 TM Fwd, T=12 x 5'  C=12 TM Rev, T=7 x 5'  C=12 Lateral ambulation closed paddles abd x 4 laps   C=12, Facing C, HR/TR with barbell/wall support x 20     C=12, Marching 4 ways with closed paddles alternate flex/ext x 10   C=12 3 way hip AROM and HS curls with TM support x 15 each   C=12 Partial Squats in TM, 2 x 10    WBOS noodle press downs 2”/10 NV  WBOS active trunk rotation with noodle support 5”/10 each direction NV    BTW abdominal draw in 5”/10  BTW DLS open Aquacisor push/pull, up/down x 15 each  BTW SKTC 30\" hold x 2 jp     BTW Dynamic Hamstring Stretch x 10 jp NV  Facing C=12 DLS BUE closed paddles flex/ext, ABD/ADD 2 x 10   Facing C=12 DLS closed paddles horizontal ADD/ABD 2 x 10 NV  BTW Piriformis stretch 20\" x 2 jp NV    Deep End with noodle:  Bicycle fwd 3 minutes  XC ski, jumping jacks x 2 minute each  Vertical hang x 2 minutes       "

## 2024-02-28 ENCOUNTER — TREATMENT (OUTPATIENT)
Dept: PHYSICAL THERAPY | Facility: CLINIC | Age: 64
End: 2024-02-28
Payer: COMMERCIAL

## 2024-02-28 DIAGNOSIS — M51.9 LUMBOSACRAL DISC DISEASE: Primary | ICD-10-CM

## 2024-02-28 DIAGNOSIS — M47.817 DJD (DEGENERATIVE JOINT DISEASE), LUMBOSACRAL: ICD-10-CM

## 2024-02-28 PROCEDURE — 97110 THERAPEUTIC EXERCISES: CPT | Mod: GP

## 2024-02-28 PROCEDURE — 97012 MECHANICAL TRACTION THERAPY: CPT | Mod: GP

## 2024-02-28 PROCEDURE — 97112 NEUROMUSCULAR REEDUCATION: CPT | Mod: GP

## 2024-02-28 NOTE — PROGRESS NOTES
Physical Therapy  Physical Therapy Progress Note    Patient Name Martin Benson   MRN: [id number]  Today's Date: 02/28/24  Time Calculation  Start Time: 0845  Stop Time: 0935  Time Calculation (min): 50 min    Insurance:    (per information provided by  pre-cert team)  Visit #: 9  Approval required:  N  # of visits approved:  60    Therapy Diagnoses:   1. Lumbosacral disc disease        2. DJD (degenerative joint disease), lumbosacral          General:  Reason for visit: chronic DDD   Referred by: Lorelei Hernandez MD appt:  4/3/2024  Preferred Name:  Sameer  Script:  PT eval and treat, lumbar traction  Onset Date:  1/24/24    Assessment:  Patient tolerated treatment well, did well with progression to DLS standing postural stab ex this date.    Patient needs continued work on/skilled PT for: progressive DLS in pool and on land while using lumbar traction for pain reduction to address remaining functional, objective and subjective deficits to allow them to return to prior /optimal level of function with ADLs.  Patient is progressing with goals: Decreased LB pain  Skilled care:      Plan:    Continue to progress per poc:   NV add diagonal/rotation DLS in pool    Subjective:   Patient reports:  that his left knee and lower leg symptoms persist. Back pain has been reduced and he also feels more confident physically since working on core mm.   Have you fallen since last visit:  no    Precautions: no fall risk    Pain:  3/10 LS region and 5 to left med knee and leg  Location/Type of pain:  blunt/sharp    HEP compliance/understanding:  good    Objective:    Function:     Posture: vc's required while introducing diagonal DLS chops as patient had tendency to forward flex and twist. Forward head at times with standing ex, also corrected with demonstration and vc's  Gait:  Balance:   ROM:    Strength:  Flexibility:      Treatment:   **= HEP  NV= Next visit  np= not performed  nb= non-billable  G= group HEP= discharged to  "HEP    Therapeutic Exercise:     15 minutes  NuStep, L7 x 12 minutes for warm up and subjective taken  Green PB, DKC, 5\", 20x NV  Green PB, LTR, 10\", 10x    Neuromuscular Re-education:    15 minutes  M/H/L, Silver, 20x (highest rail for high rows)  DLS with  TA activation 5\" hold x 20 NP  TA activation 5\" with isometric hip adduction using ball x 20**HEP  TA activation with clams, black TB , x 20**HEP  S/l clamshells Black TB x 20 pj**HEP  Green PB Bridges 5\" hold 2 x 10  Iso hip flexion, 5\", x 10, R/L    Modalities:       Mechanical Traction         15 minutes    85# /75#, Intermittent, 45\"/15\" x 15 min (Lumbar)      "

## 2024-03-04 ENCOUNTER — DOCUMENTATION (OUTPATIENT)
Dept: PHYSICAL THERAPY | Facility: CLINIC | Age: 64
End: 2024-03-04

## 2024-03-06 ENCOUNTER — TREATMENT (OUTPATIENT)
Dept: PHYSICAL THERAPY | Facility: CLINIC | Age: 64
End: 2024-03-06
Payer: COMMERCIAL

## 2024-03-06 DIAGNOSIS — M47.817 DJD (DEGENERATIVE JOINT DISEASE), LUMBOSACRAL: ICD-10-CM

## 2024-03-06 DIAGNOSIS — M51.9 LUMBOSACRAL DISC DISEASE: Primary | ICD-10-CM

## 2024-03-06 PROCEDURE — 97012 MECHANICAL TRACTION THERAPY: CPT | Mod: GP

## 2024-03-06 PROCEDURE — 97110 THERAPEUTIC EXERCISES: CPT | Mod: GP

## 2024-03-06 PROCEDURE — 97112 NEUROMUSCULAR REEDUCATION: CPT | Mod: GP

## 2024-03-06 NOTE — PROGRESS NOTES
Physical Therapy                 Therapy Communication Note    Patient Name: Martin Benson  MRN: [id number]  Today's Date: 3/6/2024     Discipline: Physical Therapy    Missed Visit Reason:      Missed Time: Cancel    Comment: Pt arrived to find that pool temp is at 83 deg and did not want to participate with pool being that cold.

## 2024-03-06 NOTE — PROGRESS NOTES
Physical Therapy  Physical Therapy Recheck    Patient Name Martin Benson   MRN: [id number]  Today's Date: 24  Time Calculation  Start Time: 920  Stop Time: 100  Time Calculation (min): 45 min    Insurance:    (per information provided by  pre-cert team)  Visit #: 10  Approval required:  N  # of visits approved:  60    Therapy Diagnoses:   1. Lumbosacral disc disease  Follow Up In Physical Therapy      2. DJD (degenerative joint disease), lumbosacral  Follow Up In Physical Therapy        General:  Reason for visit: chronic DDD   Referred by: Lorelei Hernandez MD appt:  4/3/2024  Preferred Name:  Sameer  Script:  PT eval and treat, lumbar traction  Onset Date:  24    Assessment:  Patient has made progress with increased trunk mobility and postural awareness since starting PT.   Patient needs continued work on/skilled PT for: continued lumbar mobility, core strength, and LE functional strength to address remaining functional, objective and subjective deficits to allow them to return to prior /optimal level of function with ADLs.  Patient is progressing with goals: increased mobility, and decreased pain  Skilled care:  Supervised TE and reassessment    ST weeks  Increased postural awareness Goal Partially Met    Compliant with HEP. Goal Met    LTG:  by discharge  Increased postural awareness and posture WFL for his level of chronic disability. Goal Partially Met     pain to 3-5 and patient I with self-management of symptoms including becoming involved in a local rec warm water pool Goal Partially Met    Decreased pain and increased function with ADL's and IADL's.  Improve Oswestry to 40%    Normal gait on level and uneven surfaces community level distances for improved function in the community.    Normal reciprocal pattern on stairs for improved function to all levels of home.      Increase trunk AROM to reach mid shin with 3rd finger tip for improved function with dressing and driving.   Goal Met    Increase trunk strength and stability and R/L LE strength to WFL for improved function with chores and ADLs.  Goal Partially Met    Increase B LE flexibility to 75% of normal Goal Partially Met    Decrease R/L LE symptoms 50-75% per patient report after trial of traction and aquatics, if they are truly radicular in nature vs chronic pain from 1979 injury Goal Partially Met    I and compliant with HEP and proper back care.         Plan:   Continue to progress per POC including 1 land and 1 aquatics visit per week. Continuing to prepare patient with ex for once he is discharged to an independent land and aquatics program.    Subjective:   Patient reports:  He has noted definite improvement of strength and some decreased pain since starting PT    Have you fallen since last visit:  no    Precautions: no fall risk    Pain:  3.5/10 and 5/10  Location/Type of pain:  Back and leg; Low back is achy and left inner thigh and patella feel like some is pulling at them.    HEP compliance/understanding:  yes    Objective:   Outcome Measures:  Other Measures  Oswestry Disablity Index (JACOB): 58% to currently 52%    Posture:  Reduced forward flexed trunk and rounded shoulders as observed initially    Gait/Stairs: Pt  has reduced listing to right in standing and walking in part due to scoliosis.       ROM:  Trunk:  Flexion:  3rd to mid patella initially to mid shin currently  Extension: 20%  RSB:  3rd to mid lateral thigh initially to a couple of inches distally currently  LSB:  3rd to mid lateral thigh initially to a couple of inches distally currently    MMT:  Abd: Right 4- to now 4and  Left 4  B LE myotomes: WFL    Flexibility:  Pectorals:  significantly tight  Hamstrings:  SLR:  R: 45  L: 45  Hip flexors: from severely to moderately tight      Treatment:   **= HEP  NV= Next visit  np= not performed  nb= non-billable  G= group HEP= discharged to [facility]    Therapeutic Exercise:     20 minutes  NuStep, L7 x 10 minutes for  "warm up and subjective taken  Objective measurements taken    Neuromuscular Re-education:    16 minutes  M/H/L, Silver, 20x (highest rail for high rows)  DLS with black diagonals, 2 x 10  Red. Shoulder extension, blue, 2 x 10    TA activation 5\" hold x 20 NP  TA activation 5\" with isometric hip adduction using ball x 20**HEP  TA activation with clams, black TB , x 20**HEP  S/l clamshells Black TB x 20 red**HEP  Green PB Bridges 5\" hold 2 x 10 NP  Iso hip flexion, 5\", x 10, R/L NP    Modalities:       Mechanical Traction         9 minutes  85# /75#, Intermittent, 45\"/15\" x 8 min (Lumbar) Straps were rubbing on skin so stopped earlier than normal        "

## 2024-03-07 ENCOUNTER — TREATMENT (OUTPATIENT)
Dept: PHYSICAL THERAPY | Facility: CLINIC | Age: 64
End: 2024-03-07
Payer: COMMERCIAL

## 2024-03-07 DIAGNOSIS — M47.817 DJD (DEGENERATIVE JOINT DISEASE), LUMBOSACRAL: ICD-10-CM

## 2024-03-07 DIAGNOSIS — M51.9 LUMBOSACRAL DISC DISEASE: Primary | ICD-10-CM

## 2024-03-07 PROCEDURE — 97113 AQUATIC THERAPY/EXERCISES: CPT | Mod: GP,CQ

## 2024-03-07 NOTE — PROGRESS NOTES
Physical Therapy  Physical Therapy Progress Note    Patient Name Martin Benson   MRN: [id number]  Today's Date: 03/07/24  Time Calculation  Start Time: 0100  Stop Time: 0150  Time Calculation (min): 50 min    Insurance:    per information provided by  pre-cert team)  Visit #: 11  Approval required:  N  # of visits approved:  60  Therapy Diagnoses:   1. Lumbosacral disc disease  Follow Up In Physical Therapy      2. DJD (degenerative joint disease), lumbosacral  Follow Up In Physical Therapy          General:  Reason for visit: chronic DDD   Referred by: Lorelei Hernandez MD appt:  4/3/2024  Preferred Name:  Sameer  Script:  PT eval and treat, lumbar traction  Onset Date:  1/24/24     Assessment:  Patient tolerated treatment: well in water but had some increase low back pain with yesterdays traction. He reports the traction had been helping till the last session. Reports lower level of knee pain and good tolerance to progression in water exercise.Cueing was required with keeping abdominal bracing with exercise.  Patient needs continued work on core stability lower ext strength /skilled PT for: progression in exercise and  to address remaining functional, objective and subjective deficits to allow them to return to prior /optimal level of function with ADLs.  Patient is progressing with goals: yes  Skilled care:  ex progression     Plan:         Continue to progress per poc:   NV possible reduce poundage on traction  , second to patient complaints     Subjective:   Patient reports:   he felt worse after the mechanical traction yesterday. Stated the pain increased that evening and was worse in the AM. States he does not want to quit traction because over all it has helped.    Have you fallen since last visit:  no    Precautions: no falls risk    Pain:  5 on the Knee/10  Location/Type of pain:   LB and left knee     HEP compliance/understanding:  yes    Objective:   Objective Measurements:         Posture:  rounded  "shoulders  Gait: cueing to keep posture with exercise and abdominal bracing      Strength: progressed with core stability against the current      Treatment:   **= HEP  NV= Next visit  np= not performed  nb= non-billable  G= group HEP= discharged to HE[facility]          Aquatics:          45 minutes  TM= Treadmill, T= Treadmill speed, C=Current, BTW = Back to Wall, NV = Next Visit, NP = Not Performed, G = Group, NB = non-billable      Stair stretch HS/HF, 45\" x 1 ea, R/L     C=12 TM Fwd, T=12 x 5'  C=12 TM Rev, T=7 x 5'  C=12 Lateral ambulation  aqua ciser at hip 4 min each way   C=12, Facing C, HR/TR with barbell/wall support x 20   np     C=12, Marching 4 ways with  hydro tone rows  20 each 4 way (5 foot)    C=12 3 way hip AROM and HS curls with TM support x 15 each ( 5 foot)  C=12 Partial Squats in TM, 2 x 10  C= 12 hip circles 10x (5 foot )     WBOS noodle press downs 2”/10 NV  WBOS active trunk rotation with noodle support 5”/10 each direction NV     BTW abdominal draw in 5”/10  DLS open Aquacisor push/pull, up/down x 15 each (5 foot) facing C  BTW SKTC 30\" hold x 2 jp   np     BTW Dynamic Hamstring Stretch x 10 jp NV  Facing C=12 DLS BUE closed paddles flex/ext, ABD/ADD 2 x 10   nv  Facing C=12 DLS closed paddles horizontal ADD/ABD 2 x 10 NV  BTW Piriformis stretch 20\" x 2 jp NV     Deep End with noodle:  Bicycle fwd 3 minutes  XC ski, jumping jacks x 2 minute each  Vertical hang x 2 minutes               Education:  ex progression with POC  HEP Progression:  n/a  "

## 2024-03-12 ENCOUNTER — TREATMENT (OUTPATIENT)
Dept: PHYSICAL THERAPY | Facility: CLINIC | Age: 64
End: 2024-03-12
Payer: COMMERCIAL

## 2024-03-12 DIAGNOSIS — M47.817 DJD (DEGENERATIVE JOINT DISEASE), LUMBOSACRAL: ICD-10-CM

## 2024-03-12 DIAGNOSIS — M51.9 LUMBOSACRAL DISC DISEASE: Primary | ICD-10-CM

## 2024-03-12 PROCEDURE — 97113 AQUATIC THERAPY/EXERCISES: CPT | Mod: GP

## 2024-03-12 NOTE — PROGRESS NOTES
Physical Therapy  Physical Therapy Progress Note    Patient Name Martin Benson   MRN: [id number]  Today's Date: 03/12/24  Time Calculation  Start Time: 0804  Stop Time: 0850  Time Calculation (min): 46 min    Insurance:    per information provided by  pre-cert team)  Visit #: 12  Approval required:  N  # of visits approved:  60    Therapy Diagnoses:   1. Lumbosacral disc disease        2. DJD (degenerative joint disease), lumbosacral          General:  Reason for visit: chronic DDD   Referred by: Lorelei Hernandez MD appt:  4/3/2024  Preferred Name:  Sameer  Script:  PT eval and treat, lumbar traction  Onset Date:  1/24/24    Assessment:  Patient tolerated treatment well, challenged with controlling postural stability against increased current.  Patient needs continued work on/skilled PT for land and aquatics for postural stability and pain management: to address remaining functional, objective and subjective deficits to allow them to return to prior /optimal level of function with ADLs.  Patient is progressing with goals: improving core strength  Skilled care:  Supervised TE    Plan:    Continue to progress per poc:   NV add increased challenge on land and aquatics core ex    Subjective:   Patient reports:  pain is the same    Have you fallen since last visit:  no    Precautions: no fall risk    Pain:  3.5/10 back and 5/10 LB  Location/Type of pain:  achy back/pulling knee pa    HEP compliance/understanding:  good    Objective:   Objective Measurements:    Function:     Posture: challenged to stay steady in 5 ft section of water against increased C=10. Improved with decreased current of 8  Gait:  Balance:   ROM:    Strength:  Flexibility:      Treatment:   **= HEP  NV= Next visit  np= not performed  nb= non-billable  G= group HEP= discharged to [facility]    Aquatics:          46 minutes  TM= Treadmill, T= Treadmill speed, C=Current, BTW = Back to Wall, NV = Next Visit, NP = Not Performed, G = Group, NB = non-billable  "     Stair stretch HS/HF, 45\" x 1 ea, R/L     C=12 TM Fwd, T=12 x 5'  C=12 TM Rev, T=7 x 5'  C=12 Lateral ambulation  aqua ciser at hip 4 min each way   C=12, Facing C, HR/TR with barbell/wall support x 20   np     C=8, Marching 4 ways with  hydro tone rows  20 each 4 way (5 foot)    C=12 3 way hip AROM and HS curls with barbell x 15 each   C=12 Partial Squats with barbell, 2 x 10  C= 12 hip circles CW/CCW 10x with barbell     WBOS noodle press downs 2”/10 NV  WBOS active trunk rotation with noodle support 5”/10 each direction NV     BTW abdominal draw in 5”/10  DLS open Aquacisor push/pull, up/down x 15 each (5 foot) facing C  BTW SKTC 30\" hold x 2 jp   np     BTW Dynamic Hamstring Stretch x 10 jp NV  Facing C=12 DLS BUE closed paddles flex/ext, ABD/ADD 2 x 10   nv  Facing C=12 DLS closed paddles horizontal ADD/ABD 2 x 10 NV  BTW Piriformis stretch 20\" x 2 jp NV     Deep End with noodle:  Bicycle fwd 3 minutes  XC ski, jumping jacks x 2 minute each  Vertical hang x 2 minutes       "

## 2024-03-15 ENCOUNTER — TREATMENT (OUTPATIENT)
Dept: PHYSICAL THERAPY | Facility: CLINIC | Age: 64
End: 2024-03-15
Payer: COMMERCIAL

## 2024-03-15 DIAGNOSIS — M51.9 LUMBOSACRAL DISC DISEASE: ICD-10-CM

## 2024-03-15 DIAGNOSIS — M47.817 DJD (DEGENERATIVE JOINT DISEASE), LUMBOSACRAL: ICD-10-CM

## 2024-03-15 PROCEDURE — 97113 AQUATIC THERAPY/EXERCISES: CPT | Mod: GP

## 2024-03-15 NOTE — PROGRESS NOTES
"   Physical Therapy  Physical Therapy Progress Note    Patient Name Martin Benson   MRN: [id number]  Today's Date: 03/15/24  Time Calculation  Start Time: 1050  Stop Time: 1135  Time Calculation (min): 45 min    Insurance:    per information provided by  pre-cert team)  Visit #: 13  Approval required:  N  # of visits approved:  60    Therapy Diagnoses:   1. Lumbosacral disc disease  Follow Up In Physical Therapy      2. DJD (degenerative joint disease), lumbosacral  Follow Up In Physical Therapy        Reason for visit: chronic DDD   Referred by: Lorelei Hernandez MD appt:  4/3/2024  Preferred Name:  Sameer  Script:  PT eval and treat, lumbar traction  Onset Date:  1/24/24    Assessment:  Patient tolerated treatment well, did well with stabilization against C=12 current for DLS rowing in 4 directions  Patient needs continued work on/skilled PT for: increased challenge of land based ex and aquatics to address remaining functional, objective and subjective deficits to allow them to return to prior /optimal level of function with ADLs.  Patient is progressing with goals: increased postural awareness  Skilled care:  Supervised ex    Plan:    Continue to progress per poc:   NV add cable column walking    Subjective:   Patient reports:  pain is the same    Have you fallen since last visit:  no    Precautions: no fall risk      Pain:  3.5/10 back and 5/10 L knee  Location/Type of pain:  Achy    HEP compliance/understanding:  good    Objective:   Objective Measurements:    Function:     Posture: Fair/good control against higher current for rowing ex and LE ex  Gait:  Balance:   ROM:    Strength:  Flexibility:      Treatment:   **= HEP  NV= Next visit  np= not performed  nb= non-billable  G= group HEP= discharged to [facility]    Aquatics:          45 minutes  TM= Treadmill, T= Treadmill speed, C=Current, BTW = Back to Wall, NV = Next Visit, NP = Not Performed, G = Group, NB = non-billable      Stair stretch HS/HF, 45\" x 1 ea, R/L   " "  C=12 TM Fwd, T=12 x 5'  C=12 TM Rev, T=10 x 5'  C=12 Lateral ambulation  4 laps   C=12, Facing C, HR/TR with barbell/wall support x 20   np     C=8, Marching 4 ways with  hydro tone rows  20 each 4 way (5 foot)    C=12 3 way hip AROM and HS curls with barbell x 15 each   C=12 Partial Squats with barbell, 2 x 10 NP  C= 12 hip circles CW/CCW 10x with barbell NP     WBOS noodle press downs 2”/10 NV  WBOS active trunk rotation with noodle support 5”/10 each direction NV     BTW abdominal draw in 5”/10 NP  DLS open Aquacisor push/pull, up/down x 15 each (5 foot) facing C NP  BTW SKTC 30\" hold x 2 jp   np     BTW Dynamic Hamstring Stretch x 10 jp NV  Facing C=12 DLS BUE closed paddles flex/ext, ABD/ADD 2 x 10     Facing C=12 DLS closed paddles horizontal ADD/ABD 2 x 10   BTW Piriformis stretch 20\" x 2 jp NV     Deep End with noodle:  Bicycle fwd 3 minutes  XC ski, jumping jacks x 2 minute each  Vertical hang x 2 minutes     "

## 2024-03-18 DIAGNOSIS — I63.9 CEREBRAL INFARCTION, UNSPECIFIED (MULTI): ICD-10-CM

## 2024-03-19 ENCOUNTER — TREATMENT (OUTPATIENT)
Dept: PHYSICAL THERAPY | Facility: CLINIC | Age: 64
End: 2024-03-19
Payer: COMMERCIAL

## 2024-03-19 DIAGNOSIS — I10 ESSENTIAL (PRIMARY) HYPERTENSION: ICD-10-CM

## 2024-03-19 DIAGNOSIS — M47.817 DJD (DEGENERATIVE JOINT DISEASE), LUMBOSACRAL: ICD-10-CM

## 2024-03-19 DIAGNOSIS — M51.9 LUMBOSACRAL DISC DISEASE: Primary | ICD-10-CM

## 2024-03-19 PROCEDURE — 97112 NEUROMUSCULAR REEDUCATION: CPT | Mod: GP

## 2024-03-19 PROCEDURE — 97012 MECHANICAL TRACTION THERAPY: CPT | Mod: GP

## 2024-03-19 PROCEDURE — 97110 THERAPEUTIC EXERCISES: CPT | Mod: GP

## 2024-03-19 NOTE — PROGRESS NOTES
"   Physical Therapy  Physical Therapy Progress Note    Patient Name Martin Benson   MRN: [id number]  Today's Date: 03/19/24  Time Calculation  Start Time: 0935  Stop Time: 1025  Time Calculation (min): 50 min    Insurance:    per information provided by  pre-cert team)  Visit #: 14  Approval required:  N  # of visits approved:  60    Therapy Diagnoses:   1. Lumbosacral disc disease        2. DJD (degenerative joint disease), lumbosacral          General:  Reason for visit: chronic DDD   Referred by: Lorelei Hernandez MD appt:  4/3/2024  Preferred Name:  Sameer  Script:  PT eval and treat, lumbar traction  Onset Date:  1/24/24    Assessment:  Patient tolerated treatment well, responded well to traction with decreased symptoms   Patient needs continued work on/skilled PT for: increasing dynamic lumbar stabilization to address remaining functional, objective and subjective deficits to allow them to return to prior /optimal level of function with ADLs.  Patient is progressing with goals: Increased strength  Skilled care:  Supervised TE    Plan:    Continue to progress per poc:   NV add more challenge in pool/land for DLS ex    Subjective:   Patient reports:  Increased pain in knee today    Have you fallen since last visit:  no    Precautions: no fall risk    Pain:  5.5 /10 left knee, 4/10 back   Location/Type of pain:  Left knee sharp; Red LS Achy/sharp    HEP compliance/understanding:  yes    Objective:   Objective Measurements:    Function:   After traction, patient needs a little more time to transition to sitting.  Posture: Stable posture with standing DLS ex  Gait: Keeps left knee slightly stiffened in gait  Balance:   ROM:    Strength:  Flexibility:      Treatment:   **= HEP  NV= Next visit  np= not performed  nb= non-billable  G= group HEP= discharged to [facility]    Therapeutic Exercise:     15 minutes  NuStep, L7 x 12 minutes for warm up and subjective taken  Standing HS/HF, 30\" x 2 R/L each  Green PB, DKC, with " "bridge 5\", 20x   Green PB, LTR, 10\", 10x    Neuromuscular Reeducation 15  minutes  M/H/L, Silver, 20x (highest rail for high rows)  DLS anti-rotation horiz chop, gray tubing, 20 x R/L  TA activation 5\" hold x 20 NP  TA activation 5\" with isometric hip adduction using ball x 20**HEP  TA activation with clams, black TB , x 20**HEP  S/l clamshells Black TB x 20 jp**HEP  Iso hip flexion, 5\", x 10, R/L    Modalities:       Mechanical Traction         15 minutes    85# /75#, Intermittent, 45\"/15\" x 15 min (Lumbar)      "

## 2024-03-20 RX ORDER — VALSARTAN 80 MG/1
TABLET ORAL
Qty: 90 TABLET | Refills: 3 | Status: SHIPPED | OUTPATIENT
Start: 2024-03-20

## 2024-03-20 RX ORDER — NAPROXEN SODIUM 220 MG/1
81 TABLET, FILM COATED ORAL DAILY
Qty: 90 TABLET | Refills: 3 | Status: SHIPPED | OUTPATIENT
Start: 2024-03-20

## 2024-03-21 ENCOUNTER — TREATMENT (OUTPATIENT)
Dept: PHYSICAL THERAPY | Facility: CLINIC | Age: 64
End: 2024-03-21
Payer: COMMERCIAL

## 2024-03-21 DIAGNOSIS — M47.817 DJD (DEGENERATIVE JOINT DISEASE), LUMBOSACRAL: ICD-10-CM

## 2024-03-21 DIAGNOSIS — M51.9 LUMBOSACRAL DISC DISEASE: ICD-10-CM

## 2024-03-21 PROCEDURE — 97113 AQUATIC THERAPY/EXERCISES: CPT | Mod: GP

## 2024-03-21 NOTE — PROGRESS NOTES
Physical Therapy  Physical Therapy Progress Note    Patient Name Martin Benson   MRN: [id number]  Today's Date: 03/21/24  Time Calculation  Start Time: 0915  Stop Time: 1005  Time Calculation (min): 50 min    Insurance:    per information provided by  pre-cert team)  Visit #: 15  Approval required:  N  # of visits approved:  60    Therapy Diagnoses:   1. Lumbosacral disc disease  Follow Up In Physical Therapy      2. DJD (degenerative joint disease), lumbosacral  Follow Up In Physical Therapy        General:  Reason for visit: chronic DDD   Referred by: Lorelei Hernandez MD appt:  4/3/2024  Preferred Name:  Sameer  Script:  PT eval and treat, lumbar traction  Onset Date:  1/24/24    Assessment:  Patient tolerated treatment well, did well with progression this date to rowing with Hydrotones against current with stable trunk   Patient needs continued work on/skilled PT for: trunk stabilization and LE strengthening to address remaining functional, objective and subjective deficits to allow them to return to prior /optimal level of function with ADLs.  Patient is progressing with goals: Improved postural control  Skilled care:  ex progression    Plan:    Continue to progress per poc:   NV add increased core challenge on land    Subjective:   Patient reports:  He was sore for awhile after traction but then had decreased pain from baseline for a little while    Have you fallen since last visit:  no    Precautions: no fall risk    Pain:  5/10 Right knee, and 3.5/10 LB  Location/Type of pain:  sharp    HEP compliance/understanding:  good    Objective:   Objective Measurements:    Function:     Posture: Pt swayed against C of 10 while performing single leg exercises. With 2 feet on ground, in 5 ft section, patient has good postural control with rowing  Gait:  Balance:   ROM:    Strength:  Flexibility:      Treatment:   **= HEP  NV= Next visit  np= not performed  nb= non-billable  G= group HEP= discharged to  "HEP    Aquatics:          50 minutes  TM= Treadmill, T= Treadmill speed, C=Current, BTW = Back to Wall, NV = Next Visit, NP = Not Performed, G = Group, NB = non-billable      Stair stretch HS/HF, 45\" x 1 ea, R/L     C=10 TM Fwd, T=13 x 5'  C=10 TM Rev, T=11 x 5'  C=10 Lateral ambulation  4 laps   C=10, Facing C, HR/TR with barbell/wall support x 20        C=10, Marching 4 ways with  hydro tone rows  20 each 4 way (5 foot)    C=10  C=10 3 way hip AROM and HS curls with barbell x 20 each   C=12 Partial Squats with barbell, 2 x 10   C= 12 hip circles CW/CCW 10x with barbell      WBOS noodle press downs 2”/10 NV  WBOS active trunk rotation with noodle support 5”/10 each direction NV     BTW abdominal draw in 5”/10 NP  DLS open Aquacisor push/pull, up/down x 15 each (5 foot) facing C   Trunk rot DLS with open Aquacisor 10 x 2 in each direction  BTW SKTC 30\" hold x 2 jp   np     BTW Dynamic Hamstring Stretch x 10 jp NV  Facing C=12 DLS BUE closed paddles flex/ext, ABD/ADD 2 x 10  NP  Facing C=12 DLS closed paddles horizontal ADD/ABD 2 x 10 NP  BTW Piriformis stretch 20\" x 2 jp NV     Deep End with noodle:  Bicycle fwd 3 minutes  XC ski, jumping jacks x 2 minute each  Vertical hang x 2 minutes       "

## 2024-03-26 ENCOUNTER — TREATMENT (OUTPATIENT)
Dept: PHYSICAL THERAPY | Facility: CLINIC | Age: 64
End: 2024-03-26
Payer: COMMERCIAL

## 2024-03-26 DIAGNOSIS — M47.817 DJD (DEGENERATIVE JOINT DISEASE), LUMBOSACRAL: ICD-10-CM

## 2024-03-26 DIAGNOSIS — M51.9 LUMBOSACRAL DISC DISEASE: ICD-10-CM

## 2024-03-26 PROCEDURE — 97110 THERAPEUTIC EXERCISES: CPT | Mod: GP

## 2024-03-26 PROCEDURE — 97012 MECHANICAL TRACTION THERAPY: CPT | Mod: GP

## 2024-03-26 NOTE — PROGRESS NOTES
Physical Therapy  Physical Therapy Progress Note    Patient Name Martin Benson   MRN: [id number]  Today's Date: 03/26/24  Time Calculation  Start Time: 0930  Stop Time: 1020  Time Calculation (min): 50 min    Insurance:    per information provided by  pre-cert team)  Visit #: 16  Approval required:  N  # of visits approved:  60    Therapy Diagnoses:   1. Lumbosacral disc disease  Follow Up In Physical Therapy      2. DJD (degenerative joint disease), lumbosacral  Follow Up In Physical Therapy        General:  Reason for visit: chronic DDD   Referred by: Lorelei Hernandez MD appt:  4/3/2024  Preferred Name:  Sameer  Script:  PT eval and treat, lumbar traction  Onset Date:  1/24/24    Assessment:  Patient tolerated treatment well, tolerating 5# less of pull for lumbar traction and shorter hold time.   Patient needs continued work on/skilled PT for: preparing patient for independent aquatics program at [facility] along with progression of HEP to address remaining functional, objective and subjective deficits to allow them to return to prior /optimal level of function with ADLs.  Patient is progressing with goals: subjective note of increased strength but no change in pain  Skilled care:  mechanical traction set up and monitoring, and supervised TE    Plan:    Continue to progress per poc:   NV add Education/handouts for preparation to do independent aquatics at [facility]    Subjective:   Patient reports:  That he had increased pain last night, possibly due to the weather    Have you fallen since last visit:  no    Precautions: no fall risk    Pain:  7/10 left knee this morning and last night 7 1/2 to 8 but better now with pain meds. LBP a 5/10 with pain meds  Location/Type of pain:  sharp/achy Left knee and LB    HEP compliance/understanding:  good    Objective:   Objective Measurements:    Function:  Pt notes doing things with increased strength, but pain remains unchanged.  Posture:  "Improved posture after traction with decreased weight, and time pulled  Gait:  Balance:   ROM:    Strength:  Flexibility:      Treatment:   **= HEP  NV= Next visit  np= not performed  nb= non-billable  G= group HEP= discharged to [facility]    Therapeutic Exercise:     35 minutes  NuStep, L7 x 12 minutes for warm up and subjective taken  Standing HS/HF, 30\" x 2 R/L each  Green PB, DKC with bridge 5\", 20x   Green PB, LTR, 10\", 10x  M/H/L, Silver, 20x (highest rail for high rows)  DLS anti-rotation horiz chop, gray tubing, 20 x R/L    Modalities:       Mechanical Traction         15 minutes  80# /70#, Intermittent, 30\"/15\" x 15 min (Lumbar)    "

## 2024-03-28 ENCOUNTER — TREATMENT (OUTPATIENT)
Dept: PHYSICAL THERAPY | Facility: CLINIC | Age: 64
End: 2024-03-28
Payer: COMMERCIAL

## 2024-03-28 DIAGNOSIS — M51.9 LUMBOSACRAL DISC DISEASE: ICD-10-CM

## 2024-03-28 DIAGNOSIS — M47.817 DJD (DEGENERATIVE JOINT DISEASE), LUMBOSACRAL: ICD-10-CM

## 2024-03-28 PROCEDURE — 97113 AQUATIC THERAPY/EXERCISES: CPT | Mod: GP

## 2024-03-28 NOTE — PROGRESS NOTES
Physical Therapy  Physical Therapy Progress Note    Patient Name Martin Benson   MRN: [id number]  Today's Date: 03/28/24  Time Calculation  Start Time: 0920  Stop Time: 1000  Time Calculation (min): 40 min    Insurance:    per information provided by  pre-cert team)  Visit #: 17  Approval required:  N  # of visits approved:  60    Therapy Diagnoses:   1. Lumbosacral disc disease  Follow Up In Physical Therapy      2. DJD (degenerative joint disease), lumbosacral  Follow Up In Physical Therapy        General:  Reason for visit: chronic DDD   Referred by: Lorelei Hernandez MD appt:  4/3/2024  Preferred Name:  Sameer  Script:  PT eval and treat, lumbar traction  Onset Date:  1/24/24    Assessment:  Patient tolerated treatment well, did well with controlling posture against current, with exception of doing hip ext against current where he had more difficulty  Patient needs continued work on/skilled PT for: progressive aquatics and HEP to address remaining functional, objective and subjective deficits to allow them to return to prior /optimal level of function with ADLs.  Patient is progressing with goals: Improving mobility noted by patient after pool, and after traction  Skilled care:  Supervised aquatics ex    Plan:    Continue to progress per poc:   NV add HEP/Aquatics handouts to prepare for independent program    Subjective:   Patient reports:  he had no worsening symptoms after traction last time    Have you fallen since last visit:  no    Precautions: no fall risk    Pain:  3.5/10 LS region, 5 left knee   Location/Type of pain:  Left knee pulling pain, piercing/pressure pain to the LB    HEP compliance/understanding:  good    Objective:   Objective Measurements:    Function:     Posture: Pt maintained good control with most ex against current. Did not require many cues throughout ex.  Gait:  Balance:   ROM:    Strength:  Flexibility:      Treatment:   **= HEP  NV= Next visit  np= not performed  nb= non-billable  " G= group HEP= discharged to [facility]    Aquatics:          40 minutes  TM= Treadmill, T= Treadmill speed, C=Current, BTW = Back to Wall, NV = Next Visit, NP = Not Performed, G = Group, NB = non-billable    Stair stretch HS/HF, 45\" x 1 ea, R/L  C=11 TM Fwd, T=15 x 5'  C=11 TM Rev, T=13 x 5'  C=11 Lateral ambulation  4 laps ea direction with closed paddles, abd/add  C=11, Facing C, HR/TR with barbell/wall support x 20     C=11, Marching 4 ways with hydro tone rows  20 each 4 way (5 foot)    C=11 3 way hip AROM and HS curls with barbell x 20 each   C=12 Partial Squats with barbell, 2 x 10 NP  C= 12 hip circles CW/CCW 10x with barbell NP     BTW abdominal draw in 5”/10 NP  DLS open Aquacisor push/pull, up/down x 15 each facing C=11   Trunk rot DLS with open Aquacisor 10 x 2 in each direction against C=11  BTW SKTC 30\" hold x 2 jp  NP     BTW Dynamic Hamstring Stretch x 10 jp NV  Facing C=12 DLS BUE closed paddles flex/ext, ABD/ADD 2 x 10  NP  Facing C=12 DLS closed paddles horizontal ADD/ABD 2 x 10 NP  BTW Piriformis stretch 20\" x 2 jp NV     Deep End with noodle:  Bicycle fwd 3 minutes  XC ski, jumping jacks x 2 minute each  Vertical hang x 2 minutes     Education:  gave pt paperwork to prepare to go to [place] pool    "

## 2024-04-01 ENCOUNTER — TREATMENT (OUTPATIENT)
Dept: PHYSICAL THERAPY | Facility: CLINIC | Age: 64
End: 2024-04-01
Payer: COMMERCIAL

## 2024-04-01 DIAGNOSIS — M51.9 LUMBOSACRAL DISC DISEASE: ICD-10-CM

## 2024-04-01 DIAGNOSIS — M47.817 DJD (DEGENERATIVE JOINT DISEASE), LUMBOSACRAL: ICD-10-CM

## 2024-04-01 PROCEDURE — 97113 AQUATIC THERAPY/EXERCISES: CPT | Mod: GP,CQ

## 2024-04-01 NOTE — PROGRESS NOTES
Physical Therapy  Physical Therapy Progress Note    Patient Name Martin Benson   MRN: [id number]  Today's Date: 04/01/24  Time Calculation  Start Time: 0845  Stop Time: 0924  Time Calculation (min): 39 min    Insurance:    per information provided by  pre-cert team)  Visit #: 18  Approval required:  N  # of visits approved:  60    Therapy Diagnoses:   1. Lumbosacral disc disease  Follow Up In Physical Therapy      2. DJD (degenerative joint disease), lumbosacral  Follow Up In Physical Therapy        General:  Reason for visit: chronic DDD   Referred by: Lorelei Hernandez MD appt:  4/3/2024  Preferred Name:  Sameer  Script:  PT eval and treat, lumbar traction  Onset Date:  1/24/24    Assessment:  Patient tolerated treatment well, did well with trunk stabilization against increased current. Improved posture with verbal cueing for visual fixation.   Patient is progressing with goals: Improving mobility noted by patient after pool, and after traction  Skilled care:  Aquatic exercise progression, HEP progression, patient education    Plan:    Patient will be discharged and will initiate AQUA HEP in the community after this session.     Subjective:   Patient reports pain remains the same, but feels relief in the pool.     Have you fallen since last visit:  no    Precautions: no fall risk    Pain:  3.5/10 LS region, 5.5 left knee   Location/Type of pain:  Left knee pulling pain, piercing/pressure pain to the LB    HEP compliance/understanding:  good    Objective:   Objective Measurements:    Function:   Plans to start going to [facility] after this appointment.  Posture: FWD head, rounded shoulders, requiring frequent verbal cues for postural awareness  Gait: Ambulates on pool deck with step through gait with quick kimo and decreased BLE heel strike and toe off that improved with 4 feet unloading in pool.   Balance:       Treatment:   **= HEP  NV= Next visit  np= not performed  nb= non-billable  G= group HEP= discharged to  "HEP    Aquatics:          39 minutes  TM= Treadmill, T= Treadmill speed, C=Current, BTW = Back to Wall, NV = Next Visit, NP = Not Performed, G = Group, NB = non-billable    Stair stretch HS/HF, 45\" x 1 ea, R/L  C=15 TM Fwd, T=15 x 5'  C=15 TM Rev, T=13 x 5'  C=15 Lateral ambulation  4 laps ea direction with closed paddles, abd/add  C=15, Facing C, HR/TR with barbell/wall support x 20     C=15, Marching 4 ways with hydro tone rows  20 each 4 way (5 foot)    C=15 3 way hip AROM and HS curls with barbell x 20 each   C=15 Partial Squats with BUE closed paddle flex/ext x 10 facing C  C= 15 hip circles CW/CCW 10x with barbell     BTW abdominal draw in 5”/10 NP  DLS open Aquacisor push/pull, up/down x 15 each facing C=15  Trunk rot DLS with open Aquacisor 10 x 2 in each direction against C=15  BTW SKTC 30\" hold x 2 jp  NP    BTW Dynamic Hamstring Stretch x 10 jp NV  Facing C=12 DLS BUE closed paddles flex/ext, ABD/ADD 2 x 10  NP  Facing C=12 DLS closed paddles horizontal ADD/ABD 2 x 10 NP  BTW Piriformis stretch 20\" x 2 jp NV    Deep End with noodle:  Bicycle fwd 3 minutes  XC ski, jumping jacks x 2 minute each  Vertical hang w/ box breathing x 3 minutes     Education: Overview of [facility], boc breathing   HEP Progression: AQUA HEP Printouts and overview provided  "

## 2024-04-02 ENCOUNTER — TREATMENT (OUTPATIENT)
Dept: PHYSICAL THERAPY | Facility: CLINIC | Age: 64
End: 2024-04-02
Payer: COMMERCIAL

## 2024-04-02 DIAGNOSIS — M47.817 DJD (DEGENERATIVE JOINT DISEASE), LUMBOSACRAL: ICD-10-CM

## 2024-04-02 DIAGNOSIS — M51.9 LUMBOSACRAL DISC DISEASE: ICD-10-CM

## 2024-04-02 PROCEDURE — 97110 THERAPEUTIC EXERCISES: CPT | Mod: GP

## 2024-04-02 NOTE — PROGRESS NOTES
Physical Therapy  Physical Therapy Progress Note    Patient Name Martin Benson   MRN: [id number]  Today's Date: 24  Time Calculation  Start Time: 0850  Stop Time: 0935  Time Calculation (min): 45 min    Insurance:    Visit #: 19  Insurance Reviewed  (per information provided by  pre-cert team)   Authorization required:  N  Approved # of visits: 60  Authorization/MCR certification date range:  n/a    Therapy Diagnoses:   Problem List Items Addressed This Visit             ICD-10-CM    Lumbosacral disc disease M51.9    DJD (degenerative joint disease), lumbosacral M47.817     General:  Reason for visit: chronic DDD   Referred by: Lorelei Hernandez MD appt:  4/3/2024  Preferred Name:  Sameer  Script:  PT eval and treat, lumbar traction  Onset Date:  24    Assessment:  Pt has made good gains in PT for lumbar mobility and LE/core strengthening. Pain hasn't changed but he has the knowledge to continue addressing his symptoms and impairments on his own  Patient is progressing with goals: see below  Skilled care:  recheck and discharge planning    ST weeks  Increased postural awareness Goal Partially Met    Compliant with HEP. Goal     LTG:  by discharge  Increased postural awareness and posture WFL for his level of chronic disability. Goal Met     pain to 3-5 and patient I with self-management of symptoms including becoming involved in a local rec warm water pool Goal Met    Decreased pain and increased function with ADL's and IADL's.  Improve Oswestry /NDI to: 40% limitation of function. Goal Partially Met    Normal gait on level and uneven surfaces community level distances for improved function in the community. Not Met    Normal reciprocal pattern on stairs for improved function to all levels of home.  Goal Partially Met    Increase trunk AROM to reach mid shin with 3rd finger tip for improved function with dressing and driving.  Goal Met    Increase trunk strength and stability and R/L LE  strength to WFL for improved function with chores and ADLs.  Goal Met    Increase B LE flexibility to 75% of normal Goal Partially Met    Decrease R/L LE symptoms 50-75% per patient report after trial of traction and aquatics, if they are truly radicular in nature vs chronic pain from 1979 injury Not Met    I and compliant with HEP and proper back care.   Goal Met    Plan:    Discharge to independent management of exercises including joining a gym, going to [place] for aquatics, and doing HEP    Subjective:   Patient reports:  He has noted improvement in mobility and strength, but his pain has remained unchanged. He also can't walk as long of distances as he wishes he could.    Have you fallen since last visit:  no    Precautions: no fall risk    Pain:  3.5/10 LB, 5 to 5.5/10 knee  Location/Type of pain:  LS, left LE and left knee    HEP compliance/understanding:  good    Objective:   Outcome Measures:  Other Measures  Oswestry Disablity Index (JACOB): 58% to 52%    Posture:  Forward flexed trunk and rounded shoulders initially, but improved awareness of posture observed currently    Gait/Stairs: Pt stands and ambulates more erect than initiall.  Left pelvis and rib cage sit lower than right.    ROM:  Trunk:  Flexion:  3rd to mid patella to now mid shin  Extension: 20% no change  RSB:  3rd to mid lateral thigh to currently lateral knee  LSB:  3rd to mid lateral thigh to currently lateral knee    MMT:  Hip Flexion: 4+ jp  Hip Abd: Right 4- and 4; Left 4 and now 4+  LE extensors: poor initially to now R 4 and L 4  B LE myotomes: WFL    Flexibility:  Hamstrings:  SLR:  R: 45 initially to now 70  L: 45 initially to now 70  Hip flexors: severely tight initially to moderately tight    Treatment:   **= HEP  NV= Next visit  np= not performed  nb= non-billable  G= group HEP= discharged to HEP    Therapeutic Exercise:     38 minutes  Created HEP for home  Discussed plan for joining gym and aquatics  Objective findings  taken    Education: goal achievement discussed, discharge planning  HEP Progression:    Access Code: [email or web address]  URL: [email or web address]  Date: 04/02/2024  Prepared by: Maranda Sanchez    Exercises  - Supine Transversus Abdominis Bracing - Hands on Stomach  - 1-2 x daily - 7 x weekly - 1-2 sets - 10 reps - 5 count hold  - Supine March  - 1-2 x daily - 7 x weekly - 1 sets - 10 reps - 3 count hold  - Supine Hip Adduction Isometric with Ball  - 1-2 x daily - 7 x weekly - 1-2 sets - 10 reps - 5-10 count hold  - Hooklying Clamshell with Resistance  - 1-2 x daily - 7 x weekly - 1-2 sets - 10 reps - 3-5 count hold  - Seated Lumbar Flexion Stretch  - 1-2 x daily - 7 x weekly - 1 sets - 5-10 reps - 10 count hold  - Standing Hamstring Stretch at Step with hand rail  - 1-2 x daily - 7 x weekly - 1 sets - 2-3 reps - 30 seconds hold  - Hip Flexor Stretch on Step  - 1-2 x daily - 7 x weekly - 1 sets - 2-3 reps - 30 seconds hold

## 2024-04-03 ENCOUNTER — OFFICE VISIT (OUTPATIENT)
Dept: PRIMARY CARE | Facility: CLINIC | Age: 64
End: 2024-04-03
Payer: COMMERCIAL

## 2024-04-03 ENCOUNTER — APPOINTMENT (OUTPATIENT)
Dept: PRIMARY CARE | Facility: CLINIC | Age: 64
End: 2024-04-03
Payer: COMMERCIAL

## 2024-04-03 VITALS
HEART RATE: 94 BPM | WEIGHT: 216 LBS | OXYGEN SATURATION: 94 % | BODY MASS INDEX: 30.92 KG/M2 | DIASTOLIC BLOOD PRESSURE: 82 MMHG | TEMPERATURE: 98.1 F | RESPIRATION RATE: 16 BRPM | SYSTOLIC BLOOD PRESSURE: 132 MMHG | HEIGHT: 70 IN

## 2024-04-03 DIAGNOSIS — M47.817 DJD (DEGENERATIVE JOINT DISEASE), LUMBOSACRAL: ICD-10-CM

## 2024-04-03 DIAGNOSIS — M50.30 DDD (DEGENERATIVE DISC DISEASE), CERVICAL: Primary | ICD-10-CM

## 2024-04-03 DIAGNOSIS — T84.093S FAILED TOTAL LEFT KNEE REPLACEMENT, SEQUELA: ICD-10-CM

## 2024-04-03 DIAGNOSIS — Q21.12 PFO (PATENT FORAMEN OVALE) (HHS-HCC): ICD-10-CM

## 2024-04-03 DIAGNOSIS — M45.9 RHEUMATOID ARTHRITIS INVOLVING VERTEBRA WITH POSITIVE RHEUMATOID FACTOR (MULTI): ICD-10-CM

## 2024-04-03 DIAGNOSIS — I63.9 THROMBOEMBOLIC STROKE (MULTI): ICD-10-CM

## 2024-04-03 DIAGNOSIS — E65 CENTRAL OBESITY: ICD-10-CM

## 2024-04-03 PROCEDURE — 1036F TOBACCO NON-USER: CPT | Performed by: FAMILY MEDICINE

## 2024-04-03 PROCEDURE — 99214 OFFICE O/P EST MOD 30 MIN: CPT | Performed by: FAMILY MEDICINE

## 2024-04-03 PROCEDURE — 3075F SYST BP GE 130 - 139MM HG: CPT | Performed by: FAMILY MEDICINE

## 2024-04-03 PROCEDURE — 3079F DIAST BP 80-89 MM HG: CPT | Performed by: FAMILY MEDICINE

## 2024-04-03 ASSESSMENT — PATIENT HEALTH QUESTIONNAIRE - PHQ9
2. FEELING DOWN, DEPRESSED OR HOPELESS: NOT AT ALL
SUM OF ALL RESPONSES TO PHQ9 QUESTIONS 1 AND 2: 0
1. LITTLE INTEREST OR PLEASURE IN DOING THINGS: NOT AT ALL

## 2024-04-03 ASSESSMENT — PAIN SCALES - GENERAL: PAINLEVEL: 0-NO PAIN

## 2024-04-03 NOTE — PROGRESS NOTES
Subjective   Martin Benson is a 64 y.o. male who presents for Follow-up (Follow up visit today, patient complains of migraines and fatigue today).    HPI  : Patient is a 64-year-old male who is in for reevaluation regarding his migraine headaches, he also recently saw his cardiologist and they did do a stress test and echocardiogram and he wanted to review some of the results in the computer.  I have tried to access these results and [facility] but these test may have been done at the doctor's office and I cannot access the results in epic.  Patient will try to have a copy of the echocardiogram and stress test sent to the office.  Patient still has some of his migraine headache pills at home and he states that he did take 2 this morning and they have helped with migraine significantly.  He also is concerned about his blood pressure and his weight gain.  Since he has retired from work he has gained about 20 pounds and he knows that he has to get back on his diet.    Objective  : ROS : 10 systems were reviewed and the information is included in the HPI and no additional review of systems is indicated.    Physical Exam  Vitals and nursing note reviewed.   Constitutional:       Appearance: Normal appearance. He is obese.      Comments: Patient is alert and oriented x3.   No acute distress and trying to get back on his diet and lose some weight.   HENT:      Head: Normocephalic.      Right Ear: Tympanic membrane and external ear normal.      Left Ear: Tympanic membrane and external ear normal.      Ears:      Comments: Ears are patent bilaterally and TMs are clear.     Nose: Nose normal.      Mouth/Throat:      Mouth: Mucous membranes are moist.      Pharynx: Oropharynx is clear.      Comments: Mouth is moist, tongue is midline.  No posterior pharyngeal erythema.  Eyes:      Extraocular Movements: Extraocular movements intact.      Conjunctiva/sclera: Conjunctivae normal.      Pupils: Pupils are equal, round,  and reactive to light.      Comments: Patient denies any visual disturbances but does get  migraines then he has to close his eyes and go to sleep.   Neck:      Comments: Restricted  range   of motion cervical spine due to arthritis and spasm.  Patient has cervical degenerative disc disease.  No carotid bruits, no thyromegaly, no cervical adenopathy.  Occasional neck spasm and restriction of motion secondary to stress and tension.  Cardiovascular:      Rate and Rhythm: Normal rate and regular rhythm.      Pulses: Normal pulses.      Heart sounds: Normal heart sounds.      Comments: Saw cardiology and had Echo and stress test.  Will try to obtain results.  Cardiologist had told him he may need  a  heart catherization  For further evaluation.  Patient had repair of his patent foramen ovale about a year ago and has been stable.  Patient denies chest pain and no palpitations.  Heart rhythm is stable S1 and S2 are noted.   Pulmonary:      Effort: Pulmonary effort is normal.      Breath sounds: Normal breath sounds.      Comments: Patient denies any shortness of breath.  Denies any coughing or wheezing.  Lungs are clear to auscultation.    Abdominal:      General: Bowel sounds are normal.      Palpations: Abdomen is soft.      Comments: Abdomen is soft and somewhat obese, he has gained about 20 pounds since the retired from work.  Denies any rebound tenderness and no flank pain.  No heartburn or bowel problems.   Genitourinary:     Comments: Patient denies dysuria, no hematuria, no nocturia, denies flank pain.  Musculoskeletal:         General: Tenderness present.      Cervical back: Tenderness present.      Comments: Left total knee replaced and also had a second revision procedure.  He also has arthritis in his right knee but does not want another   joint replacement surgery.  Age-related arthritis in the joints.  Patient has chronic cervical and lumbosacral degenerative disc disease and recently completed physical  therapy.  He states that therapy does help.  His most recent x-rays did show arthritis  and narrowing in the lower lumbar spine.  Patient states the physical therapy did help.   Skin:     General: Skin is warm.      Comments: There is no bruising, no erythema, no skin lesions noted, no rashes.   Neurological:      General: No focal deficit present.      Mental Status: He is alert and oriented to person, place, and time. Mental status is at baseline.      Sensory: Sensory deficit present.      Comments: Patient had a migraine this morning and had to take his medication for migraine.  He is feeling better this afternoon.  Patient also had a mild thromboembolic CVA approximately 3 years ago and has recovered fully.  He does have some lumbosacral radicular pain and sciatica which is currently improving after physical therapy.  Coordination and gait are stable.  Normal muscle strength upper and lower extremities.   Psychiatric:         Mood and Affect: Mood normal.         Behavior: Behavior normal.         Thought Content: Thought content normal.         Judgment: Judgment normal.      Comments: Patient has normal mood and affect.  Thought content and judgment are stable.  No signs of vascular dementia.  Patient does try to stay active working around his house and  repairing his automobiles.     PLAN : Patient is a 64-year-old male who was evaluated today for several problems and concerns.  He still occasionally gets migraine headaches and he did recently refill his migraine medication which does work when he needs it.  He also had a echocardiogram and stress test recently with his cardiologist at [facility] and I will try to obtain those results since they are on a different computer system and I could not find them in epic.  Patient wanted to review the results because the was told he may need a heart catheterization.  He had to retire from his job due to medical reasons and he just completed physical therapy  which has helped his lower back problems.  Patient otherwise is stable and will follow-up in 3 to 4 months or sooner as needed.  I did ask him to try to have the stress test and echocardiogram results faxed to her office and we will also try to obtain those through the computer.    Problem List Items Addressed This Visit    None           Stewart Moseley, DO

## 2024-05-21 DIAGNOSIS — M51.9 LUMBOSACRAL DISC DISEASE: ICD-10-CM

## 2024-05-21 DIAGNOSIS — T84.093S FAILED TOTAL LEFT KNEE REPLACEMENT, SEQUELA: ICD-10-CM

## 2024-07-23 ENCOUNTER — TELEPHONE (OUTPATIENT)
Dept: PRIMARY CARE | Facility: CLINIC | Age: 64
End: 2024-07-23
Payer: COMMERCIAL

## 2024-07-23 DIAGNOSIS — G89.29 CHRONIC BILATERAL LOW BACK PAIN WITHOUT SCIATICA: ICD-10-CM

## 2024-07-23 DIAGNOSIS — M51.9 LUMBOSACRAL DISC DISEASE: Primary | ICD-10-CM

## 2024-07-23 DIAGNOSIS — M54.50 CHRONIC BILATERAL LOW BACK PAIN WITHOUT SCIATICA: ICD-10-CM

## 2024-07-23 RX ORDER — CYCLOBENZAPRINE HCL 10 MG
10 TABLET ORAL 2 TIMES DAILY PRN
Qty: 30 TABLET | Refills: 1 | Status: SHIPPED | OUTPATIENT
Start: 2024-07-23 | End: 2024-09-21

## 2024-07-23 RX ORDER — METHYLPREDNISOLONE 4 MG/1
TABLET ORAL
Qty: 21 TABLET | Refills: 0 | Status: SHIPPED | OUTPATIENT
Start: 2024-07-23 | End: 2024-07-30

## 2024-07-25 ENCOUNTER — TELEPHONE (OUTPATIENT)
Dept: PRIMARY CARE | Facility: CLINIC | Age: 64
End: 2024-07-25
Payer: COMMERCIAL

## 2024-12-01 DIAGNOSIS — I65.29 OCCLUSION AND STENOSIS OF UNSPECIFIED CAROTID ARTERY: ICD-10-CM

## 2024-12-02 RX ORDER — ATORVASTATIN CALCIUM 80 MG/1
80 TABLET, FILM COATED ORAL DAILY
Qty: 90 TABLET | Refills: 3 | Status: SHIPPED | OUTPATIENT
Start: 2024-12-02

## 2025-02-05 ENCOUNTER — APPOINTMENT (OUTPATIENT)
Dept: PRIMARY CARE | Facility: CLINIC | Age: 65
End: 2025-02-05
Payer: COMMERCIAL

## 2025-02-05 VITALS
RESPIRATION RATE: 16 BRPM | DIASTOLIC BLOOD PRESSURE: 83 MMHG | WEIGHT: 155 LBS | HEART RATE: 97 BPM | HEIGHT: 70 IN | TEMPERATURE: 97.9 F | SYSTOLIC BLOOD PRESSURE: 124 MMHG | BODY MASS INDEX: 22.19 KG/M2 | OXYGEN SATURATION: 95 %

## 2025-02-05 DIAGNOSIS — I67.89 CEREBRAL MICROVASCULAR DISEASE: ICD-10-CM

## 2025-02-05 DIAGNOSIS — R13.12 OROPHARYNGEAL DYSPHAGIA: Primary | ICD-10-CM

## 2025-02-05 DIAGNOSIS — R63.4 WEIGHT LOSS, ABNORMAL: ICD-10-CM

## 2025-02-05 DIAGNOSIS — F33.3 SEVERE EPISODE OF RECURRENT MAJOR DEPRESSIVE DISORDER, WITH PSYCHOTIC FEATURES (MULTI): ICD-10-CM

## 2025-02-05 PROCEDURE — 1036F TOBACCO NON-USER: CPT | Performed by: FAMILY MEDICINE

## 2025-02-05 PROCEDURE — 1123F ACP DISCUSS/DSCN MKR DOCD: CPT | Performed by: FAMILY MEDICINE

## 2025-02-05 PROCEDURE — 1159F MED LIST DOCD IN RCRD: CPT | Performed by: FAMILY MEDICINE

## 2025-02-05 PROCEDURE — 3074F SYST BP LT 130 MM HG: CPT | Performed by: FAMILY MEDICINE

## 2025-02-05 PROCEDURE — 3008F BODY MASS INDEX DOCD: CPT | Performed by: FAMILY MEDICINE

## 2025-02-05 PROCEDURE — 3079F DIAST BP 80-89 MM HG: CPT | Performed by: FAMILY MEDICINE

## 2025-02-05 PROCEDURE — 1126F AMNT PAIN NOTED NONE PRSNT: CPT | Performed by: FAMILY MEDICINE

## 2025-02-05 PROCEDURE — 99214 OFFICE O/P EST MOD 30 MIN: CPT | Performed by: FAMILY MEDICINE

## 2025-02-05 PROCEDURE — 1160F RVW MEDS BY RX/DR IN RCRD: CPT | Performed by: FAMILY MEDICINE

## 2025-02-05 ASSESSMENT — PATIENT HEALTH QUESTIONNAIRE - PHQ9
1. LITTLE INTEREST OR PLEASURE IN DOING THINGS: NEARLY EVERY DAY
SUM OF ALL RESPONSES TO PHQ9 QUESTIONS 1 AND 2: 6
2. FEELING DOWN, DEPRESSED OR HOPELESS: NEARLY EVERY DAY

## 2025-02-05 ASSESSMENT — PAIN SCALES - GENERAL: PAINLEVEL_OUTOF10: 0-NO PAIN
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
## 2025-02-25 ENCOUNTER — PATIENT OUTREACH (OUTPATIENT)
Dept: PRIMARY CARE | Facility: CLINIC | Age: 65
End: 2025-02-25
Payer: COMMERCIAL

## 2025-02-25 RX ORDER — BUPROPION HYDROCHLORIDE 100 MG/1
200 TABLET ORAL
COMMUNITY
Start: 2025-02-23 | End: 2025-03-25

## 2025-02-25 NOTE — PROGRESS NOTES
Discharge Facility:[facility]  Discharge Diagnosis:MDD, severe  Admission Date:2/6/25  Discharge Date: 2/24/25    PCP Appointment Date:3/5/25  Specialist Appointment Date: Neurology 3/21/25  Hospital Encounter and Summary Linked: Yes  See discharge assessment below for further details    Wrap Up  Wrap Up Additional Comments: Patient's daughter states he is doing much better. He has his discharge medication. He started Wellbutrin and stoped Prolixin. She states she sees a big inprovement but waiting for Prolixin to be out of his system. Patient has a pcp follow up on 3/5/25 and Neurology appointment scheduled on 3/21/25. No questions or concerns at this time. (2/25/2025 10:43 AM)    Medications  Medications reviewed with patient/caregiver?: Yes (2/25/2025 10:43 AM)  Is the patient having any side effects they believe may be caused by any medication additions or changes?: No (2/25/2025 10:43 AM)  Does the patient have all medications ordered at discharge?: Yes (2/25/2025 10:43 AM)  Care Management Interventions: Provided patient education (2/25/2025 10:43 AM)  Prescription Comments: Wellbutrin (2/25/2025 10:43 AM)  Is the patient taking all medications as directed (includes completed medication regime)?: Yes (2/25/2025 10:43 AM)  Care Management Interventions: Provided patient education (2/25/2025 10:43 AM)  Medication Comments: Stop Prolixin (2/25/2025 10:43 AM)    Appointments  Does the patient have a primary care provider?: Yes (2/25/2025 10:43 AM)  Care Management Interventions: Verified appointment date/time/provider (2/25/2025 10:43 AM)  Has the patient kept scheduled appointments due by today?: Yes (2/25/2025 10:43 AM)  Care Management Interventions: Advised patient to keep appointment; Educated on importance of keeping appointment (2/25/2025 10:43 AM)    Self Management  Has home health visited the patient within 72 hours of discharge?: Not applicable (2/25/2025 10:43 AM)  What Durable Medical Equipment (DME)  was ordered?: N/A (2/25/2025 10:43 AM)    Patient Teaching  Does the patient have access to their discharge instructions?: Yes (2/25/2025 10:43 AM)  Care Management Interventions: Reviewed instructions with patient (2/25/2025 10:43 AM)  What is the patient's perception of their health status since discharge?: Improving (2/25/2025 10:43 AM)  Is the patient/caregiver able to teach back the hierarchy of who to call/visit for symptoms/problems? PCP, Specialist, Home Health nurse, Urgent Care, ED, 911: Yes (2/25/2025 10:43 AM)

## 2025-02-27 ENCOUNTER — TELEPHONE (OUTPATIENT)
Dept: PRIMARY CARE | Facility: CLINIC | Age: 65
End: 2025-02-27
Payer: COMMERCIAL

## 2025-03-05 ENCOUNTER — APPOINTMENT (OUTPATIENT)
Dept: PRIMARY CARE | Facility: CLINIC | Age: 65
End: 2025-03-05
Payer: COMMERCIAL

## 2025-03-05 VITALS
RESPIRATION RATE: 16 BRPM | BODY MASS INDEX: 22.33 KG/M2 | HEIGHT: 70 IN | OXYGEN SATURATION: 95 % | WEIGHT: 156 LBS | DIASTOLIC BLOOD PRESSURE: 74 MMHG | HEART RATE: 76 BPM | TEMPERATURE: 97.2 F | SYSTOLIC BLOOD PRESSURE: 111 MMHG

## 2025-03-05 DIAGNOSIS — F34.1 PERSISTENT DEPRESSIVE DISORDER: ICD-10-CM

## 2025-03-05 DIAGNOSIS — F51.04 PSYCHOPHYSIOLOGICAL INSOMNIA: ICD-10-CM

## 2025-03-05 DIAGNOSIS — E55.9 VITAMIN D DEFICIENCY: ICD-10-CM

## 2025-03-05 DIAGNOSIS — Z12.5 ENCOUNTER FOR SCREENING PROSTATE SPECIFIC ANTIGEN (PSA) MEASUREMENT: ICD-10-CM

## 2025-03-05 DIAGNOSIS — R53.81 MALAISE AND FATIGUE: ICD-10-CM

## 2025-03-05 DIAGNOSIS — I10 ESSENTIAL HYPERTENSION: ICD-10-CM

## 2025-03-05 DIAGNOSIS — R53.83 MALAISE AND FATIGUE: ICD-10-CM

## 2025-03-05 DIAGNOSIS — K59.01 SLOW TRANSIT CONSTIPATION: ICD-10-CM

## 2025-03-05 DIAGNOSIS — Z09 HOSPITAL DISCHARGE FOLLOW-UP: Primary | ICD-10-CM

## 2025-03-05 DIAGNOSIS — E78.5 DYSLIPIDEMIA: ICD-10-CM

## 2025-03-05 PROCEDURE — 99495 TRANSJ CARE MGMT MOD F2F 14D: CPT | Performed by: FAMILY MEDICINE

## 2025-03-05 PROCEDURE — 3008F BODY MASS INDEX DOCD: CPT | Performed by: FAMILY MEDICINE

## 2025-03-05 PROCEDURE — 1036F TOBACCO NON-USER: CPT | Performed by: FAMILY MEDICINE

## 2025-03-05 PROCEDURE — 1126F AMNT PAIN NOTED NONE PRSNT: CPT | Performed by: FAMILY MEDICINE

## 2025-03-05 PROCEDURE — 3078F DIAST BP <80 MM HG: CPT | Performed by: FAMILY MEDICINE

## 2025-03-05 PROCEDURE — 1159F MED LIST DOCD IN RCRD: CPT | Performed by: FAMILY MEDICINE

## 2025-03-05 PROCEDURE — 1160F RVW MEDS BY RX/DR IN RCRD: CPT | Performed by: FAMILY MEDICINE

## 2025-03-05 PROCEDURE — 3074F SYST BP LT 130 MM HG: CPT | Performed by: FAMILY MEDICINE

## 2025-03-05 PROCEDURE — 1123F ACP DISCUSS/DSCN MKR DOCD: CPT | Performed by: FAMILY MEDICINE

## 2025-03-05 RX ORDER — TRAZODONE HYDROCHLORIDE 50 MG/1
50 TABLET ORAL NIGHTLY PRN
Qty: 30 TABLET | Refills: 1 | Status: SHIPPED | OUTPATIENT
Start: 2025-03-05 | End: 2025-05-04

## 2025-03-05 RX ORDER — PLECANATIDE 3 MG/1
3 TABLET ORAL DAILY
Qty: 6 TABLET | Refills: 0 | COMMUNITY
Start: 2025-03-05 | End: 2025-03-11

## 2025-03-05 ASSESSMENT — PATIENT HEALTH QUESTIONNAIRE - PHQ9
1. LITTLE INTEREST OR PLEASURE IN DOING THINGS: SEVERAL DAYS
2. FEELING DOWN, DEPRESSED OR HOPELESS: NOT AT ALL
SUM OF ALL RESPONSES TO PHQ9 QUESTIONS 1 AND 2: 1

## 2025-03-05 ASSESSMENT — PAIN SCALES - GENERAL: PAINLEVEL_OUTOF10: 0-NO PAIN
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
## 2025-03-06 DIAGNOSIS — R74.8 ELEVATED LIVER ENZYMES: ICD-10-CM

## 2025-03-06 DIAGNOSIS — D50.8 IRON DEFICIENCY ANEMIA SECONDARY TO INADEQUATE DIETARY IRON INTAKE: Primary | ICD-10-CM

## 2025-03-06 LAB
25(OH)D3+25(OH)D2 SERPL-MCNC: 60 NG/ML (ref 30–100)
ALBUMIN SERPL-MCNC: 3.8 G/DL (ref 3.6–5.1)
ALP SERPL-CCNC: 119 U/L (ref 35–144)
ALT SERPL-CCNC: 64 U/L (ref 9–46)
ANION GAP SERPL CALCULATED.4IONS-SCNC: 9 MMOL/L (CALC) (ref 7–17)
AST SERPL-CCNC: 28 U/L (ref 10–35)
BILIRUB SERPL-MCNC: 0.6 MG/DL (ref 0.2–1.2)
BUN SERPL-MCNC: 12 MG/DL (ref 7–25)
CALCIUM SERPL-MCNC: 9.2 MG/DL (ref 8.6–10.3)
CHLORIDE SERPL-SCNC: 103 MMOL/L (ref 98–110)
CHOLEST SERPL-MCNC: 119 MG/DL
CHOLEST/HDLC SERPL: 3.4 (CALC)
CO2 SERPL-SCNC: 30 MMOL/L (ref 20–32)
CREAT SERPL-MCNC: 0.7 MG/DL (ref 0.7–1.35)
EGFRCR SERPLBLD CKD-EPI 2021: 102 ML/MIN/1.73M2
ERYTHROCYTE [DISTWIDTH] IN BLOOD BY AUTOMATED COUNT: 14.4 % (ref 11–15)
GLUCOSE SERPL-MCNC: 115 MG/DL (ref 65–99)
HCT VFR BLD AUTO: 34.4 % (ref 38.5–50)
HDLC SERPL-MCNC: 35 MG/DL
HGB BLD-MCNC: 11 G/DL (ref 13.2–17.1)
LDLC SERPL CALC-MCNC: 66 MG/DL (CALC)
MCH RBC QN AUTO: 27.5 PG (ref 27–33)
MCHC RBC AUTO-ENTMCNC: 32 G/DL (ref 32–36)
MCV RBC AUTO: 86 FL (ref 80–100)
NONHDLC SERPL-MCNC: 84 MG/DL (CALC)
PLATELET # BLD AUTO: 335 THOUSAND/UL (ref 140–400)
PMV BLD REES-ECKER: 12.1 FL (ref 7.5–12.5)
POTASSIUM SERPL-SCNC: 4.2 MMOL/L (ref 3.5–5.3)
PROT SERPL-MCNC: 6.5 G/DL (ref 6.1–8.1)
PSA SERPL-MCNC: 2.17 NG/ML
RBC # BLD AUTO: 4 MILLION/UL (ref 4.2–5.8)
SODIUM SERPL-SCNC: 142 MMOL/L (ref 135–146)
TRIGL SERPL-MCNC: 93 MG/DL
TSH SERPL-ACNC: 1.96 MIU/L (ref 0.4–4.5)
WBC # BLD AUTO: 6.8 THOUSAND/UL (ref 3.8–10.8)

## 2025-03-06 NOTE — RESULT ENCOUNTER NOTE
White blood cell count is normal                 he is a little bit anemic at 11.0 should be above 13.2           he should take an iron vitamin daily     just watch that does not make him more constipated.         Kidney   function is normal            1 liver enzyme was up slightly at 64   And should be under 46         we will continue to just monitor that.        Cholesterol is normal at 119         triglycerides are normal at 93       thyroid function is normal       vitamin D is normal at 60        stable level is normal 2.17        we will just monitor the liver enzyme   and try to take a vitamin with iron     for the anemia

## 2025-03-07 ENCOUNTER — PATIENT OUTREACH (OUTPATIENT)
Dept: PRIMARY CARE | Facility: CLINIC | Age: 65
End: 2025-03-07
Payer: COMMERCIAL

## 2025-03-07 NOTE — PROGRESS NOTES
Call regarding appt. with PCP on 3/5/25 after hospitalization.  At time of outreach call the patient feels as if their condition has improved since last visit.  Reviewed the PCP appointment with the pt and addressed any questions or concerns.

## 2025-03-20 ENCOUNTER — APPOINTMENT (OUTPATIENT)
Dept: BEHAVIORAL HEALTH | Facility: CLINIC | Age: 65
End: 2025-03-20
Payer: COMMERCIAL

## 2025-03-24 DIAGNOSIS — F34.1 PERSISTENT DEPRESSIVE DISORDER: Primary | ICD-10-CM

## 2025-03-24 RX ORDER — BUPROPION HYDROCHLORIDE 100 MG/1
200 TABLET ORAL DAILY
Qty: 60 TABLET | Refills: 3 | Status: SHIPPED | OUTPATIENT
Start: 2025-03-24 | End: 2025-07-22

## 2025-04-03 ENCOUNTER — PATIENT OUTREACH (OUTPATIENT)
Dept: PRIMARY CARE | Facility: CLINIC | Age: 65
End: 2025-04-03

## 2025-04-03 ENCOUNTER — APPOINTMENT (OUTPATIENT)
Dept: BEHAVIORAL HEALTH | Facility: CLINIC | Age: 65
End: 2025-04-03
Payer: COMMERCIAL

## 2025-04-03 VITALS
BODY MASS INDEX: 22.48 KG/M2 | HEIGHT: 70 IN | HEART RATE: 96 BPM | TEMPERATURE: 98.1 F | DIASTOLIC BLOOD PRESSURE: 79 MMHG | RESPIRATION RATE: 18 BRPM | WEIGHT: 157 LBS | SYSTOLIC BLOOD PRESSURE: 113 MMHG

## 2025-04-03 DIAGNOSIS — F34.1 PERSISTENT DEPRESSIVE DISORDER: ICD-10-CM

## 2025-04-03 DIAGNOSIS — F41.1 GENERALIZED ANXIETY DISORDER: ICD-10-CM

## 2025-04-03 DIAGNOSIS — F33.3 SEVERE EPISODE OF RECURRENT MAJOR DEPRESSIVE DISORDER, WITH PSYCHOTIC FEATURES (MULTI): Primary | ICD-10-CM

## 2025-04-03 RX ORDER — BUSPIRONE HYDROCHLORIDE 7.5 MG/1
7.5 TABLET ORAL 2 TIMES DAILY
Qty: 60 TABLET | Refills: 1 | Status: SHIPPED | OUTPATIENT
Start: 2025-04-03 | End: 2025-04-03 | Stop reason: ALTCHOICE

## 2025-04-03 RX ORDER — OLANZAPINE 2.5 MG/1
2.5 TABLET ORAL NIGHTLY
Qty: 30 TABLET | Refills: 1 | Status: SHIPPED | OUTPATIENT
Start: 2025-04-03 | End: 2025-05-03

## 2025-04-03 ASSESSMENT — PAIN SCALES - GENERAL: PAINLEVEL_OUTOF10: 0-NO PAIN

## 2025-04-03 NOTE — PROGRESS NOTES
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 [phone] or go to your closest Emergency Room if you feel unsafe. This includes thoughts of hurting yourself or anyone else, or having other troubles such as hearing voices, seeing visions, or having new and scary thoughts about the people around you.      Aminah Barrios MD

## 2025-04-07 ENCOUNTER — APPOINTMENT (OUTPATIENT)
Dept: PRIMARY CARE | Facility: CLINIC | Age: 65
End: 2025-04-07
Payer: COMMERCIAL

## 2025-04-09 ENCOUNTER — OFFICE VISIT (OUTPATIENT)
Dept: GASTROENTEROLOGY | Facility: HOSPITAL | Age: 65
End: 2025-04-09
Payer: COMMERCIAL

## 2025-04-09 VITALS
TEMPERATURE: 97.3 F | DIASTOLIC BLOOD PRESSURE: 74 MMHG | WEIGHT: 159.9 LBS | HEART RATE: 80 BPM | OXYGEN SATURATION: 98 % | SYSTOLIC BLOOD PRESSURE: 101 MMHG | BODY MASS INDEX: 22.94 KG/M2

## 2025-04-09 DIAGNOSIS — K59.00 CONSTIPATION, UNSPECIFIED CONSTIPATION TYPE: ICD-10-CM

## 2025-04-09 DIAGNOSIS — R13.12 OROPHARYNGEAL DYSPHAGIA: ICD-10-CM

## 2025-04-09 DIAGNOSIS — K22.70 BARRETT'S ESOPHAGUS WITHOUT DYSPLASIA: Primary | ICD-10-CM

## 2025-04-09 DIAGNOSIS — D12.6 ADENOMATOUS POLYP OF COLON, UNSPECIFIED PART OF COLON: ICD-10-CM

## 2025-04-09 PROCEDURE — 1159F MED LIST DOCD IN RCRD: CPT | Performed by: PHYSICIAN ASSISTANT

## 2025-04-09 PROCEDURE — 3078F DIAST BP <80 MM HG: CPT | Performed by: PHYSICIAN ASSISTANT

## 2025-04-09 PROCEDURE — 99214 OFFICE O/P EST MOD 30 MIN: CPT | Performed by: PHYSICIAN ASSISTANT

## 2025-04-09 PROCEDURE — 1036F TOBACCO NON-USER: CPT | Performed by: PHYSICIAN ASSISTANT

## 2025-04-09 PROCEDURE — 1126F AMNT PAIN NOTED NONE PRSNT: CPT | Performed by: PHYSICIAN ASSISTANT

## 2025-04-09 PROCEDURE — 1123F ACP DISCUSS/DSCN MKR DOCD: CPT | Performed by: PHYSICIAN ASSISTANT

## 2025-04-09 PROCEDURE — 3074F SYST BP LT 130 MM HG: CPT | Performed by: PHYSICIAN ASSISTANT

## 2025-04-09 PROCEDURE — 99204 OFFICE O/P NEW MOD 45 MIN: CPT | Performed by: PHYSICIAN ASSISTANT

## 2025-04-09 RX ORDER — POLYETHYLENE GLYCOL 3350 17 G/17G
17 POWDER, FOR SOLUTION ORAL 2 TIMES DAILY PRN
Qty: 510 G | Refills: 1 | Status: SHIPPED | OUTPATIENT
Start: 2025-04-09 | End: 2025-05-09

## 2025-04-09 ASSESSMENT — ENCOUNTER SYMPTOMS
DYSURIA: 0
DEPRESSION: 0
HEMATURIA: 0
LOSS OF SENSATION IN FEET: 0
ANAL BLEEDING: 1
NAUSEA: 0
CHOKING: 0
DIARRHEA: 0
OCCASIONAL FEELINGS OF UNSTEADINESS: 0
TROUBLE SWALLOWING: 0
AGITATION: 0
DYSPHORIC MOOD: 0
JOINT SWELLING: 0
ABDOMINAL DISTENTION: 1
EYE REDNESS: 0
SORE THROAT: 0
BRUISES/BLEEDS EASILY: 0
UNEXPECTED WEIGHT CHANGE: 1
VOMITING: 0
COUGH: 0
APPETITE CHANGE: 1
CONSTIPATION: 1
CONFUSION: 0

## 2025-04-09 ASSESSMENT — PAIN SCALES - GENERAL: PAINLEVEL_OUTOF10: 0-NO PAIN
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
## 2025-04-09 NOTE — PATIENT INSTRUCTIONS
Please call [phone] to schedule your upper scope and colonoscopy either at [facility] or [facility].    As of now, increase your Miralax to twice daily and increase fiber in diet.  If you decide to do the cleanse and then trial of Linzess after, message me.    Talk to his DrIsidro About Plavix and whether or not he is suppose to be taking it. If he is taking, hold the use at least 5 days before the procedures.

## 2025-04-14 DIAGNOSIS — Z12.11 COLON CANCER SCREENING: ICD-10-CM

## 2025-04-14 RX ORDER — POLYETHYLENE GLYCOL 3350, SODIUM SULFATE, POTASSIUM CHLORIDE, MAGNESIUM SULFATE, AND SODIUM CHLORIDE FOR ORAL SOLUTION 178.7-7.3G
1 KIT ORAL SEE ADMIN INSTRUCTIONS
Qty: 1 EACH | Refills: 0 | Status: SHIPPED | OUTPATIENT
Start: 2025-04-14

## 2025-04-17 ENCOUNTER — APPOINTMENT (OUTPATIENT)
Dept: BEHAVIORAL HEALTH | Facility: CLINIC | Age: 65
End: 2025-04-17
Payer: COMMERCIAL

## 2025-04-23 ENCOUNTER — APPOINTMENT (OUTPATIENT)
Dept: PRIMARY CARE | Facility: CLINIC | Age: 65
End: 2025-04-23
Payer: MEDICARE

## 2025-04-23 VITALS
DIASTOLIC BLOOD PRESSURE: 72 MMHG | HEIGHT: 70 IN | WEIGHT: 161 LBS | OXYGEN SATURATION: 96 % | HEART RATE: 75 BPM | TEMPERATURE: 97.9 F | BODY MASS INDEX: 23.05 KG/M2 | SYSTOLIC BLOOD PRESSURE: 105 MMHG | RESPIRATION RATE: 18 BRPM

## 2025-04-23 DIAGNOSIS — G89.29 CHRONIC BACK PAIN GREATER THAN 3 MONTHS DURATION: ICD-10-CM

## 2025-04-23 DIAGNOSIS — F51.04 PSYCHOPHYSIOLOGICAL INSOMNIA: ICD-10-CM

## 2025-04-23 DIAGNOSIS — M54.9 CHRONIC BACK PAIN GREATER THAN 3 MONTHS DURATION: ICD-10-CM

## 2025-04-23 DIAGNOSIS — F33.3 SEVERE EPISODE OF RECURRENT MAJOR DEPRESSIVE DISORDER, WITH PSYCHOTIC FEATURES (MULTI): Primary | ICD-10-CM

## 2025-04-23 DIAGNOSIS — M51.9 LUMBOSACRAL DISC DISEASE: ICD-10-CM

## 2025-04-23 DIAGNOSIS — H53.9 VISUAL DISTURBANCE: ICD-10-CM

## 2025-04-23 PROCEDURE — 3078F DIAST BP <80 MM HG: CPT | Performed by: FAMILY MEDICINE

## 2025-04-23 PROCEDURE — 1159F MED LIST DOCD IN RCRD: CPT | Performed by: FAMILY MEDICINE

## 2025-04-23 PROCEDURE — 3008F BODY MASS INDEX DOCD: CPT | Performed by: FAMILY MEDICINE

## 2025-04-23 PROCEDURE — 99214 OFFICE O/P EST MOD 30 MIN: CPT | Performed by: FAMILY MEDICINE

## 2025-04-23 PROCEDURE — 3074F SYST BP LT 130 MM HG: CPT | Performed by: FAMILY MEDICINE

## 2025-04-23 PROCEDURE — 1126F AMNT PAIN NOTED NONE PRSNT: CPT | Performed by: FAMILY MEDICINE

## 2025-04-23 PROCEDURE — 1036F TOBACCO NON-USER: CPT | Performed by: FAMILY MEDICINE

## 2025-04-23 PROCEDURE — 1123F ACP DISCUSS/DSCN MKR DOCD: CPT | Performed by: FAMILY MEDICINE

## 2025-04-23 PROCEDURE — 1160F RVW MEDS BY RX/DR IN RCRD: CPT | Performed by: FAMILY MEDICINE

## 2025-04-23 ASSESSMENT — PAIN SCALES - GENERAL: PAINLEVEL_OUTOF10: 0-NO PAIN
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
## 2025-04-28 ENCOUNTER — PATIENT OUTREACH (OUTPATIENT)
Dept: PRIMARY CARE | Facility: CLINIC | Age: 65
End: 2025-04-28
Payer: MEDICARE

## 2025-05-01 ENCOUNTER — APPOINTMENT (OUTPATIENT)
Dept: BEHAVIORAL HEALTH | Facility: CLINIC | Age: 65
End: 2025-05-01
Payer: MEDICARE

## 2025-05-01 VITALS
BODY MASS INDEX: 23.37 KG/M2 | SYSTOLIC BLOOD PRESSURE: 120 MMHG | DIASTOLIC BLOOD PRESSURE: 77 MMHG | RESPIRATION RATE: 20 BRPM | HEIGHT: 70 IN | WEIGHT: 163.2 LBS | HEART RATE: 79 BPM

## 2025-05-01 DIAGNOSIS — F51.04 PSYCHOPHYSIOLOGICAL INSOMNIA: ICD-10-CM

## 2025-05-01 DIAGNOSIS — F34.1 PERSISTENT DEPRESSIVE DISORDER: ICD-10-CM

## 2025-05-01 PROCEDURE — 3074F SYST BP LT 130 MM HG: CPT | Performed by: STUDENT IN AN ORGANIZED HEALTH CARE EDUCATION/TRAINING PROGRAM

## 2025-05-01 PROCEDURE — 99214 OFFICE O/P EST MOD 30 MIN: CPT | Performed by: STUDENT IN AN ORGANIZED HEALTH CARE EDUCATION/TRAINING PROGRAM

## 2025-05-01 PROCEDURE — 3078F DIAST BP <80 MM HG: CPT | Performed by: STUDENT IN AN ORGANIZED HEALTH CARE EDUCATION/TRAINING PROGRAM

## 2025-05-01 PROCEDURE — 3008F BODY MASS INDEX DOCD: CPT | Performed by: STUDENT IN AN ORGANIZED HEALTH CARE EDUCATION/TRAINING PROGRAM

## 2025-05-01 RX ORDER — OLANZAPINE 5 MG/1
5 TABLET, FILM COATED ORAL NIGHTLY
Qty: 30 TABLET | Refills: 11 | Status: SHIPPED | OUTPATIENT
Start: 2025-05-01 | End: 2026-05-01

## 2025-05-01 RX ORDER — TRAZODONE HYDROCHLORIDE 50 MG/1
50 TABLET ORAL NIGHTLY PRN
Qty: 30 TABLET | Refills: 1 | Status: SHIPPED | OUTPATIENT
Start: 2025-05-01 | End: 2025-06-30
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
## 2025-05-09 DIAGNOSIS — K59.00 CONSTIPATION, UNSPECIFIED CONSTIPATION TYPE: ICD-10-CM

## 2025-05-09 RX ORDER — POLYETHYLENE GLYCOL 3350 17 G/17G
17 POWDER, FOR SOLUTION ORAL 2 TIMES DAILY PRN
Qty: 510 G | Refills: 1 | Status: SHIPPED | OUTPATIENT
Start: 2025-05-09 | End: 2025-06-08

## 2025-05-10 NOTE — PROGRESS NOTES
I reviewed the resident/fellow's documentation and discussed the patient with the resident/fellow. I agree with the resident/fellow's medical decision making as documented in the note.     Adriel Ribeiro MD PhD

## 2025-05-27 ENCOUNTER — ANESTHESIA (OUTPATIENT)
Dept: GASTROENTEROLOGY | Facility: HOSPITAL | Age: 65
End: 2025-05-27
Payer: MEDICARE

## 2025-05-27 ENCOUNTER — ANESTHESIA EVENT (OUTPATIENT)
Dept: GASTROENTEROLOGY | Facility: HOSPITAL | Age: 65
End: 2025-05-27
Payer: MEDICARE

## 2025-05-27 ENCOUNTER — HOSPITAL ENCOUNTER (OUTPATIENT)
Dept: GASTROENTEROLOGY | Facility: HOSPITAL | Age: 65
Discharge: HOME | End: 2025-05-27
Payer: MEDICARE

## 2025-05-27 VITALS
RESPIRATION RATE: 16 BRPM | HEIGHT: 70 IN | HEART RATE: 59 BPM | BODY MASS INDEX: 22.48 KG/M2 | DIASTOLIC BLOOD PRESSURE: 76 MMHG | OXYGEN SATURATION: 99 % | WEIGHT: 157 LBS | TEMPERATURE: 96.8 F | SYSTOLIC BLOOD PRESSURE: 121 MMHG

## 2025-05-27 DIAGNOSIS — K59.00 CONSTIPATION, UNSPECIFIED CONSTIPATION TYPE: Primary | ICD-10-CM

## 2025-05-27 DIAGNOSIS — D12.6 ADENOMATOUS POLYP OF COLON, UNSPECIFIED PART OF COLON: ICD-10-CM

## 2025-05-27 DIAGNOSIS — K22.70 BARRETT'S ESOPHAGUS WITHOUT DYSPLASIA: ICD-10-CM

## 2025-05-27 PROCEDURE — 3700000001 HC GENERAL ANESTHESIA TIME - INITIAL BASE CHARGE

## 2025-05-27 PROCEDURE — 45380 COLONOSCOPY AND BIOPSY: CPT | Performed by: INTERNAL MEDICINE

## 2025-05-27 PROCEDURE — A45385 PR COLONOSCOPY,REMV LESN,SNARE: Performed by: ANESTHESIOLOGIST ASSISTANT

## 2025-05-27 PROCEDURE — 7100000009 HC PHASE TWO TIME - INITIAL BASE CHARGE

## 2025-05-27 PROCEDURE — 43239 EGD BIOPSY SINGLE/MULTIPLE: CPT | Performed by: INTERNAL MEDICINE

## 2025-05-27 PROCEDURE — 45385 COLONOSCOPY W/LESION REMOVAL: CPT | Performed by: INTERNAL MEDICINE

## 2025-05-27 PROCEDURE — A45385 PR COLONOSCOPY,REMV LESN,SNARE: Performed by: STUDENT IN AN ORGANIZED HEALTH CARE EDUCATION/TRAINING PROGRAM

## 2025-05-27 PROCEDURE — 3700000002 HC GENERAL ANESTHESIA TIME - EACH INCREMENTAL 1 MINUTE

## 2025-05-27 PROCEDURE — 7100000010 HC PHASE TWO TIME - EACH INCREMENTAL 1 MINUTE

## 2025-05-27 PROCEDURE — 2500000004 HC RX 250 GENERAL PHARMACY W/ HCPCS (ALT 636 FOR OP/ED): Mod: JZ | Performed by: ANESTHESIOLOGIST ASSISTANT

## 2025-05-27 RX ORDER — PROPOFOL 10 MG/ML
INJECTION, EMULSION INTRAVENOUS AS NEEDED
Status: DISCONTINUED | OUTPATIENT
Start: 2025-05-27 | End: 2025-05-27

## 2025-05-27 RX ORDER — LIDOCAINE HYDROCHLORIDE 20 MG/ML
INJECTION, SOLUTION EPIDURAL; INFILTRATION; INTRACAUDAL; PERINEURAL AS NEEDED
Status: DISCONTINUED | OUTPATIENT
Start: 2025-05-27 | End: 2025-05-27

## 2025-05-27 RX ADMIN — PROPOFOL 20 MG: 10 INJECTION, EMULSION INTRAVENOUS at 10:06

## 2025-05-27 RX ADMIN — LIDOCAINE HYDROCHLORIDE 100 MG: 20 INJECTION, SOLUTION EPIDURAL; INFILTRATION; INTRACAUDAL; PERINEURAL at 09:57

## 2025-05-27 RX ADMIN — PROPOFOL 150 MCG/KG/MIN: 10 INJECTION, EMULSION INTRAVENOUS at 09:58

## 2025-05-27 RX ADMIN — PROPOFOL 80 MG: 10 INJECTION, EMULSION INTRAVENOUS at 09:57

## 2025-05-27 SDOH — HEALTH STABILITY: MENTAL HEALTH: CURRENT SMOKER: 0

## 2025-05-27 ASSESSMENT — ENCOUNTER SYMPTOMS
CARDIOVASCULAR NEGATIVE: 1
CONSTITUTIONAL NEGATIVE: 1
NEUROLOGICAL NEGATIVE: 1
RESPIRATORY NEGATIVE: 1
ENDOCRINE NEGATIVE: 1

## 2025-05-27 ASSESSMENT — PAIN - FUNCTIONAL ASSESSMENT
PAIN_FUNCTIONAL_ASSESSMENT: 0-10

## 2025-05-27 ASSESSMENT — PAIN SCALES - GENERAL
PAINLEVEL_OUTOF10: 0 - NO PAIN
PAIN_LEVEL: 0
PAINLEVEL_OUTOF10: 0 - NO PAIN

## 2025-05-27 ASSESSMENT — COLUMBIA-SUICIDE SEVERITY RATING SCALE - C-SSRS
1. IN THE PAST MONTH, HAVE YOU WISHED YOU WERE DEAD OR WISHED YOU COULD GO TO SLEEP AND NOT WAKE UP?: NO
6. HAVE YOU EVER DONE ANYTHING, STARTED TO DO ANYTHING, OR PREPARED TO DO ANYTHING TO END YOUR LIFE?: NO
2. HAVE YOU ACTUALLY HAD ANY THOUGHTS OF KILLING YOURSELF?: NO

## 2025-05-27 NOTE — Clinical Note
Pre - Arellano's esophagus, h/o polyps  EGD - biopsies  Colonoscopy - polypectomies   Post - r/o h pylori, Arellano's esophagus, ascending polyp, transverse polyp, hemorrhoids

## 2025-05-27 NOTE — ANESTHESIA PREPROCEDURE EVALUATION
"Patient: Martin Benson \"Don\"    Procedure Information       Anesthesia Start Date/Time: 25 0953    Scheduled providers: Nader Dinh MD    Procedures:       COLONOSCOPY      EGD    Location: [facility]            Relevant Problems   Cardiac   (+) Essential hypertension   (+) PFO (patent foramen ovale) ([facility])      Neuro   (+) Carotid atherosclerosis   (+) Persistent depressive disorder   (+) Severe episode of recurrent major depressive disorder, with psychotic features (Multi)   (+) Thromboembolic stroke (Multi)      GI   (+) Oropharyngeal dysphagia      Endocrine   (+) Central obesity      Musculoskeletal   (+) Lumbosacral disc disease   (+) Rheumatoid arthritis involving vertebra with positive rheumatoid factor (Multi)       Clinical information reviewed:   Tobacco  Allergies  Meds   Med Hx  Surg Hx   Fam Hx  Soc Hx        NPO Detail:  NPO/Void Status  Carbohydrate Drink Given Prior to Surgery? : N  Date of Last Liquid: 25  Time of Last Liquid: 0500  Date of Last Solid: 25  Time of Last Solid: 1100  Last Intake Type: Clear fluids  Time of Last Void: 2300         Physical Exam    Airway  Mallampati: III  TM distance: >3 FB  Mouth openin finger widths     Cardiovascular - normal exam  Rhythm: regular  Rate: normal     Dental - normal exam     Pulmonary - normal exam   Abdominal - normal examAbdomen: soft             Anesthesia Plan    History of general anesthesia?: yes  History of complications of general anesthesia?: no    ASA 3     MAC     The patient is not a current smoker.  Patient was previously instructed to abstain from smoking on day of procedure.  Patient did not smoke on day of procedure.  Education provided regarding risk of obstructive sleep apnea.  intravenous induction   Postoperative pain plan includes opioids.  Anesthetic plan and risks discussed with patient.  Use of blood products discussed with patient who.      "

## 2025-05-27 NOTE — H&P
"History Of Present Illness  Martin Benson \"Sameer\" is a 65 y.o. male presenting with h/o BE, h/o polyps and KARL     Past Medical History  Medical History[1]  Surgical History  Surgical History[2]  Social History  He reports that he has never smoked. He has never been exposed to tobacco smoke. He has never used smokeless tobacco. He reports that he does not currently use alcohol. He reports that he does not use drugs.    Family History  Family History[3]     Allergies  Allergies[4]  Review of Systems   Constitutional: Negative.    Respiratory: Negative.     Cardiovascular: Negative.    Endocrine: Negative.    Genitourinary: Negative.    Skin: Negative.    Neurological: Negative.         Physical Exam  Constitutional:       Appearance: Normal appearance.   HENT:      Head: Normocephalic and atraumatic.   Cardiovascular:      Rate and Rhythm: Normal rate and regular rhythm.   Pulmonary:      Effort: Pulmonary effort is normal.      Breath sounds: Normal breath sounds.   Abdominal:      General: Abdomen is flat. Bowel sounds are normal.      Palpations: Abdomen is soft.      Tenderness: There is no abdominal tenderness.   Musculoskeletal:         General: Normal range of motion.   Skin:     General: Skin is warm.   Neurological:      General: No focal deficit present.      Mental Status: He is alert.          Last Recorded Vitals  Blood pressure 116/78, pulse 65, temperature 36.5 °C (97.7 °F), resp. rate 17, height 1.778 m (5' 10\"), weight 71.2 kg (157 lb), SpO2 100%.    Assessment/Plan   /o BE, h/o polyps and KARL  EGD   Colonoscopy      Nader Dinh MD       [1]   Past Medical History:  Diagnosis Date    Abnormal findings on diagnostic imaging of other specified body structures     Abnormal carotid ultrasound    Allergic contact dermatitis, unspecified cause 05/04/2018    Allergic dermatitis    Ataxic gait 08/11/2021    Ataxic gait    Congenital pes cavus, unspecified foot 12/10/2019    Cavus deformity of foot    " Contact with and (suspected) exposure to other viral communicable diseases 08/06/2020    Exposure to SARS virus    Dorsalgia, unspecified 02/02/2021    Chronic back pain greater than 3 months duration    Encounter for examination of ears and hearing without abnormal findings 02/19/2020    Examination of ears and hearing    Episodic tension-type headache, not intractable 08/11/2021    Headache, tension type, episodic    Gluteal tendinitis, right hip 11/04/2015    Gluteal tendinitis, right    Hallux rigidus, right foot 01/05/2020    Hallux rigidus of both feet    Headache, unspecified 02/02/2021    Occipital headache    Inflammatory polyarthropathy (Multi) 07/29/2021    Polyarthritis, inflammatory    Ingrowing nail 06/03/2021    Ingrown toenail    Migraine, unspecified, not intractable, without status migrainosus 08/11/2021    Migraine, unspecified, not intractable, without status migrainosus    Muscle weakness (generalized) 11/22/2017    Quadriceps weakness    Nasal congestion 04/05/2016    Head congestion    Other chest pain 04/13/2019    Atypical chest pain    Other enthesopathies, not elsewhere classified 04/02/2018    Shoulder capsulitis, right    Other hammer toe(s) (acquired), right foot 06/03/2021    Acquired hammertoe of right foot    Other hemorrhoids 08/02/2021    Internal hemorrhoids    Other hypertrophic disorders of the skin 08/11/2021    Cutaneous skin tags    Other long term (current) drug therapy     Controlled substance agreement signed    Other malaise 07/29/2021    Malaise and fatigue    Other specified cough 03/24/2020    Allergic cough    Other specified counseling 12/13/2019    Encounter for medication review and counseling    Overweight 12/13/2019    Overweight (BMI 25.0-29.9)    Pain in left ankle and joints of left foot 01/05/2020    Toe joint pain, left    Pain in left foot 11/19/2019    Left foot pain    Pain in left toe(s) 06/03/2021    Chronic pain of toe of left foot    Pain in right  ankle and joints of right foot 06/03/2021    Toe joint pain, right    Pain in right foot 11/19/2019    Intractable right heel pain    Pain in right toe(s) 06/03/2021    Chronic pain of toe of right foot    Peripheral vascular disease, unspecified 01/16/2020    Claudication, class I    Personal history of other diseases of the digestive system 12/20/2016    History of rectal bleeding    Personal history of other diseases of the digestive system 10/07/2021    History of hemorrhoids    Personal history of other diseases of the musculoskeletal system and connective tissue     History of joint pain    Personal history of other diseases of the musculoskeletal system and connective tissue 12/13/2019    History of low back pain    Personal history of other diseases of the musculoskeletal system and connective tissue 02/02/2021    History of inflammation of sacroiliac joint    Personal history of other diseases of the respiratory system 07/29/2021    History of allergic rhinitis    Personal history of other endocrine, nutritional and metabolic disease 01/21/2021    History of hyperlipidemia    Personal history of other medical treatment     H/O Doppler ultrasound    Personal history of other specified conditions 02/02/2021    History of headache    Personal history of other specified conditions 05/18/2020    History of dizziness    Personal history of other specified conditions 01/09/2021    History of chest pain    Plantar fascial fibromatosis 12/10/2019    Plantar fasciitis, right    Pleurodynia 04/14/2019    Rib pain on left side    Presence of unspecified artificial knee joint 08/11/2021    Status post total knee replacement    Radiculopathy, cervical region 07/29/2021    Cervical radiculitis    Radiculopathy, lumbar region 11/04/2015    Acute radicular low back pain    Radiculopathy, lumbosacral region 07/29/2021    Lumbosacral radiculitis    Spondylosis without myelopathy or radiculopathy, lumbar region 08/11/2021     Degenerative arthritis of lumbar spine    Tinea barbae and tinea capitis 05/04/2018    Tinea barbae    Unilateral primary osteoarthritis, unspecified knee 08/11/2021    Primary localized osteoarthritis of knee    Unspecified thoracic, thoracolumbar and lumbosacral intervertebral disc disorder 07/29/2021    Lumbar disc disease   [2]   Past Surgical History:  Procedure Laterality Date    COLONOSCOPY  11/06/2014    Complete Colonoscopy    KNEE SURGERY  11/04/2013    Knee Surgery    MR HEAD ANGIO WO IV CONTRAST  3/4/2020    MR HEAD ANGIO WO IV CONTRAST 3/4/2020 [facility]    MR NECK ANGIO WO IV CONTRAST  3/4/2020    MR NECK ANGIO WO IV CONTRAST 3/4/2020 [facility]    OTHER SURGICAL HISTORY  11/04/2013    Nerve Block Transforaminal Epidural Lumbar    OTHER SURGICAL HISTORY  02/11/2020    Knee replacement    OTHER SURGICAL HISTORY  02/11/2020    Vasectomy   [3]   Family History  Problem Relation Name Age of Onset    Seizures Father      Diabetes Brother      Hypertension Brother     [4] No Known Allergies

## 2025-05-27 NOTE — ANESTHESIA POSTPROCEDURE EVALUATION
"Patient: Martin Benson \"Don\"    Procedure Summary       Date: 05/27/25 Room / Location: [facility]    Anesthesia Start: 0953 Anesthesia Stop: 1038    Procedures:       COLONOSCOPY      EGD Diagnosis:       Constipation, unspecified constipation type      Adenomatous polyp of colon, unspecified part of colon      Arellano's esophagus without dysplasia      Constipation, unspecified constipation type    Scheduled Providers: Nader Dinh MD Responsible Provider: Yunior Saldivar DO    Anesthesia Type: MAC ASA Status: 3            Anesthesia Type: MAC    Vitals Value Taken Time   /76 05/27/25 10:37   Temp 36 °C (96.8 °F) 05/27/25 10:37   Pulse 58 05/27/25 10:37   Resp 16 05/27/25 10:37   SpO2 98 % 05/27/25 10:37       Anesthesia Post Evaluation    Patient location during evaluation: PACU  Patient participation: complete - patient participated  Level of consciousness: sleepy but conscious  Pain score: 0  Pain management: adequate  Airway patency: patent  Cardiovascular status: acceptable  Respiratory status: acceptable  Hydration status: acceptable  Postoperative Nausea and Vomiting: none      No notable events documented.    "

## 2025-05-27 NOTE — DISCHARGE INSTRUCTIONS
Patient Instructions after a Colonoscopy      The anesthetics, sedatives or narcotics which were given to you today will be acting in your body for the next 24 hours, so you might feel a little sleepy or groggy.  This feeling should slowly wear off. Carefully read and follow the instructions.     You received sedation today:  - Do not drive or operate any machinery or power tools of any kind.   - No alcoholic beverages today, not even beer or wine.  - Do not make any important decisions or sign any legal documents.  - No over the counter medications that contain alcohol or that may cause drowsiness.  - Do not make any important decisions or sign any legal documents.  - Make sure you have someone with you for first 24 hours.    While it is common to experience mild to moderate abdominal distention, gas, or belching after your procedure, if any of these symptoms occur following discharge from the GI Lab or within one week of having your procedure, call the [facility] to be advised whether a visit to your nearest Urgent Care or Emergency Department is indicated.  Take this paper with you if you go.     - If you develop an allergic reaction to the medications that were given during your procedure such as difficulty breathing, rash, hives, severe nausea, vomiting or lightheadedness.  - If you experience chest pain, shortness of breath, severe abdominal pain, fevers and chills.  -If you develop signs and symptoms of bleeding such as blood in your spit, if your stools turn black, tarry, or bloody  - If you have not urinated within 8 hours following your procedure.  - If your IV site becomes painful, red, inflamed, or looks infected.    If you received a biopsy/polypectomy/sphincterotomy the following instructions apply below:    __ Do not use Aspirin containing products, non-steroidal medications or anti-coagulants for one week following your procedure. (Examples of these types of medications are: Advil,  Arthrotec, Aleve, Coumadin, Ecotrin, Heparin, Ibuprofen, Indocin, Motrin, Naprosyn, Nuprin, Plavix, Vioxx, and Voltarin, or their generic forms.  This list is not all-inclusive.  Check with your physician or pharmacist before resuming medications.)   __ Eat a soft diet today.  Avoid foods that are poorly digested for the next 24 hours.  These foods would include: nuts, beans, lettuce, red meats, and fried foods. Start with liquids and advance your diet as tolerated, gradually work up to eating solids.   __ Do not have a Barium Study or Enema for one week.    Your physician recommends the additional following instructions:    -You have a contact number available for emergencies. The signs and symptoms of potential delayed complications were discussed with you. You may return to normal activities tomorrow.  -Resume your previous diet.  -Continue your present medications.   -We are waiting for your pathology results.  -Your physician has recommended a repeat colonoscopy (date to be determined after pending pathology results are reviewed) for surveillance based on pathology results.  -The findings and recommendations have been discussed with you.  -The findings and recommendations were discussed with your family.  - Please see Medication Reconciliation Form for new medication/medications prescribed.       If you experience any problems or have any questions following discharge from the GI Lab, please call:        Nurse Signature                                                                        Date___________________                                                                            Patient/Responsible Party Signature                                        Date___________________

## 2025-05-30 ENCOUNTER — PATIENT MESSAGE (OUTPATIENT)
Dept: GASTROENTEROLOGY | Facility: HOSPITAL | Age: 65
End: 2025-05-30
Payer: MEDICARE

## 2025-05-30 DIAGNOSIS — K22.70 BARRETT'S ESOPHAGUS WITHOUT DYSPLASIA: Primary | ICD-10-CM

## 2025-06-02 RX ORDER — PANTOPRAZOLE SODIUM 40 MG/1
40 TABLET, DELAYED RELEASE ORAL
Qty: 30 TABLET | Refills: 5 | Status: SHIPPED | OUTPATIENT
Start: 2025-06-02

## 2025-06-05 ENCOUNTER — APPOINTMENT (OUTPATIENT)
Dept: BEHAVIORAL HEALTH | Facility: CLINIC | Age: 65
End: 2025-06-05
Payer: MEDICARE

## 2025-06-05 VITALS
BODY MASS INDEX: 24.67 KG/M2 | RESPIRATION RATE: 18 BRPM | HEART RATE: 62 BPM | SYSTOLIC BLOOD PRESSURE: 135 MMHG | HEIGHT: 70 IN | DIASTOLIC BLOOD PRESSURE: 84 MMHG | WEIGHT: 172.3 LBS

## 2025-06-05 DIAGNOSIS — F33.3 SEVERE EPISODE OF RECURRENT MAJOR DEPRESSIVE DISORDER, WITH PSYCHOTIC FEATURES (MULTI): Primary | ICD-10-CM

## 2025-06-05 PROCEDURE — 3008F BODY MASS INDEX DOCD: CPT | Performed by: STUDENT IN AN ORGANIZED HEALTH CARE EDUCATION/TRAINING PROGRAM

## 2025-06-05 PROCEDURE — 99214 OFFICE O/P EST MOD 30 MIN: CPT | Performed by: STUDENT IN AN ORGANIZED HEALTH CARE EDUCATION/TRAINING PROGRAM

## 2025-06-05 PROCEDURE — 3079F DIAST BP 80-89 MM HG: CPT | Performed by: STUDENT IN AN ORGANIZED HEALTH CARE EDUCATION/TRAINING PROGRAM

## 2025-06-05 PROCEDURE — 3075F SYST BP GE 130 - 139MM HG: CPT | Performed by: STUDENT IN AN ORGANIZED HEALTH CARE EDUCATION/TRAINING PROGRAM

## 2025-06-05 RX ORDER — BUPROPION HYDROCHLORIDE 150 MG/1
150 TABLET ORAL EVERY MORNING
Qty: 30 TABLET | Refills: 2 | Status: SHIPPED | OUTPATIENT
Start: 2025-06-05 | End: 2025-09-03
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
## 2025-06-10 LAB
LABORATORY COMMENT REPORT: NORMAL
PATH REPORT.FINAL DX SPEC: NORMAL
PATH REPORT.GROSS SPEC: NORMAL
PATH REPORT.RELEVANT HX SPEC: NORMAL
PATH REPORT.TOTAL CANCER: NORMAL

## 2025-06-11 DIAGNOSIS — K59.00 CONSTIPATION, UNSPECIFIED CONSTIPATION TYPE: ICD-10-CM

## 2025-06-11 RX ORDER — POLYETHYLENE GLYCOL 3350 17 G/17G
17 POWDER, FOR SOLUTION ORAL 2 TIMES DAILY PRN
Qty: 510 G | Refills: 1 | Status: SHIPPED | OUTPATIENT
Start: 2025-06-11 | End: 2025-07-11

## 2025-06-29 NOTE — RESULT ENCOUNTER NOTE
Martin Groves    During recent colonoscopy multiple polyps were seen and completely removed.  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  This kind of polyp is benign but precancerous.  Based on pathology results I recommend repeating colonoscopy in 7 years.    Upper endoscopy showed small segment Arellano's esophagus.  No concerning features were seen.  Repeat upper endoscopy is recommended in 3 years.  Do not hesitate to contact me if you have any questions or concerns.    Sincerely,   Nader Dinh MD

## 2025-07-10 ENCOUNTER — APPOINTMENT (OUTPATIENT)
Dept: BEHAVIORAL HEALTH | Facility: CLINIC | Age: 65
End: 2025-07-10
Payer: MEDICARE

## 2025-07-10 VITALS
WEIGHT: 174 LBS | DIASTOLIC BLOOD PRESSURE: 77 MMHG | HEART RATE: 80 BPM | RESPIRATION RATE: 18 BRPM | SYSTOLIC BLOOD PRESSURE: 118 MMHG | BODY MASS INDEX: 24.91 KG/M2 | HEIGHT: 70 IN

## 2025-07-10 DIAGNOSIS — F51.04 PSYCHOPHYSIOLOGICAL INSOMNIA: ICD-10-CM

## 2025-07-10 DIAGNOSIS — F33.3 SEVERE EPISODE OF RECURRENT MAJOR DEPRESSIVE DISORDER, WITH PSYCHOTIC FEATURES (MULTI): Primary | ICD-10-CM

## 2025-07-10 PROCEDURE — 1126F AMNT PAIN NOTED NONE PRSNT: CPT | Performed by: STUDENT IN AN ORGANIZED HEALTH CARE EDUCATION/TRAINING PROGRAM

## 2025-07-10 PROCEDURE — 3074F SYST BP LT 130 MM HG: CPT | Performed by: STUDENT IN AN ORGANIZED HEALTH CARE EDUCATION/TRAINING PROGRAM

## 2025-07-10 PROCEDURE — 3008F BODY MASS INDEX DOCD: CPT | Performed by: STUDENT IN AN ORGANIZED HEALTH CARE EDUCATION/TRAINING PROGRAM

## 2025-07-10 PROCEDURE — 3078F DIAST BP <80 MM HG: CPT | Performed by: STUDENT IN AN ORGANIZED HEALTH CARE EDUCATION/TRAINING PROGRAM

## 2025-07-10 PROCEDURE — 99214 OFFICE O/P EST MOD 30 MIN: CPT | Performed by: STUDENT IN AN ORGANIZED HEALTH CARE EDUCATION/TRAINING PROGRAM

## 2025-07-10 RX ORDER — TRAZODONE HYDROCHLORIDE 50 MG/1
50 TABLET ORAL NIGHTLY PRN
Qty: 30 TABLET | Refills: 1 | Status: SHIPPED | OUTPATIENT
Start: 2025-07-10 | End: 2025-09-08

## 2025-07-10 RX ORDER — BUPROPION HYDROCHLORIDE 300 MG/1
300 TABLET ORAL EVERY MORNING
Qty: 30 TABLET | Refills: 1 | Status: SHIPPED | OUTPATIENT
Start: 2025-07-10 | End: 2025-09-08

## 2025-07-10 ASSESSMENT — PAIN SCALES - GENERAL: PAINLEVEL_OUTOF10: 0-NO PAIN

## 2025-07-10 NOTE — PROGRESS NOTES
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  Psychiatry at [phone] with issues.  For [facility] residents, [facility] [employer] is a 24/7 hotline you can call for assistance [[phone]]. Please call [phone] or go to your closest Emergency Room if you feel unsafe. This includes thoughts of hurting yourself or anyone else, or having other troubles such as hearing voices, seeing visions, or having new and scary thoughts about the people around you.      Patient seen and discussed with Dr. Ribeiro who agrees with above plan.    Aminah Barrios MD

## 2025-07-10 NOTE — PROGRESS NOTES
I saw and evaluated the patient. I personally obtained the key and critical portions of the history and physical exam or was physically present for key and critical portions performed by the resident/fellow. I reviewed the resident/fellow's documentation and discussed the patient with the resident/fellow. I agree with the resident/fellow's medical decision making as documented in the note.    Adriel Ribeiro MD PhD

## 2025-07-19 DIAGNOSIS — Z12.11 COLON CANCER SCREENING: Primary | ICD-10-CM

## 2025-07-21 DIAGNOSIS — Z12.11 COLON CANCER SCREENING: ICD-10-CM

## 2025-07-21 RX ORDER — POLYETHYLENE GLYCOL 3350 17 G/17G
POWDER, FOR SOLUTION ORAL
Qty: 30 PACKET | Refills: 0 | Status: SHIPPED | OUTPATIENT
Start: 2025-07-21 | End: 2025-07-21 | Stop reason: SDUPTHER

## 2025-07-21 RX ORDER — POLYETHYLENE GLYCOL 3350 17 G/17G
17 POWDER, FOR SOLUTION ORAL DAILY
Qty: 30 PACKET | Refills: 3 | Status: SHIPPED | OUTPATIENT
Start: 2025-07-21

## 2025-07-28 ENCOUNTER — APPOINTMENT (OUTPATIENT)
Dept: PRIMARY CARE | Facility: CLINIC | Age: 65
End: 2025-07-28
Payer: MEDICARE

## 2025-07-28 VITALS
BODY MASS INDEX: 24.91 KG/M2 | HEART RATE: 78 BPM | HEIGHT: 70 IN | OXYGEN SATURATION: 97 % | WEIGHT: 174 LBS | RESPIRATION RATE: 16 BRPM | DIASTOLIC BLOOD PRESSURE: 70 MMHG | TEMPERATURE: 97.9 F | SYSTOLIC BLOOD PRESSURE: 104 MMHG

## 2025-07-28 DIAGNOSIS — S02.92XD: ICD-10-CM

## 2025-07-28 DIAGNOSIS — M45.9 RHEUMATOID ARTHRITIS INVOLVING VERTEBRA WITH POSITIVE RHEUMATOID FACTOR (MULTI): ICD-10-CM

## 2025-07-28 DIAGNOSIS — M51.9 LUMBOSACRAL DISC DISEASE: ICD-10-CM

## 2025-07-28 DIAGNOSIS — K22.70 BARRETT'S ESOPHAGUS WITHOUT DYSPLASIA: Primary | ICD-10-CM

## 2025-07-28 DIAGNOSIS — F51.04 PSYCHOPHYSIOLOGICAL INSOMNIA: ICD-10-CM

## 2025-07-28 DIAGNOSIS — F34.1 PERSISTENT DEPRESSIVE DISORDER: ICD-10-CM

## 2025-07-28 DIAGNOSIS — D50.8 IRON DEFICIENCY ANEMIA SECONDARY TO INADEQUATE DIETARY IRON INTAKE: ICD-10-CM

## 2025-07-28 DIAGNOSIS — H54.61 VISION LOSS, RIGHT EYE: ICD-10-CM

## 2025-07-28 DIAGNOSIS — M17.12 PRIMARY OSTEOARTHRITIS OF LEFT KNEE: ICD-10-CM

## 2025-07-28 DIAGNOSIS — S02.92XB: ICD-10-CM

## 2025-07-28 PROCEDURE — 1036F TOBACCO NON-USER: CPT | Performed by: FAMILY MEDICINE

## 2025-07-28 PROCEDURE — 1160F RVW MEDS BY RX/DR IN RCRD: CPT | Performed by: FAMILY MEDICINE

## 2025-07-28 PROCEDURE — 1159F MED LIST DOCD IN RCRD: CPT | Performed by: FAMILY MEDICINE

## 2025-07-28 PROCEDURE — 3074F SYST BP LT 130 MM HG: CPT | Performed by: FAMILY MEDICINE

## 2025-07-28 PROCEDURE — 99214 OFFICE O/P EST MOD 30 MIN: CPT | Performed by: FAMILY MEDICINE

## 2025-07-28 PROCEDURE — 3078F DIAST BP <80 MM HG: CPT | Performed by: FAMILY MEDICINE

## 2025-07-28 PROCEDURE — 3008F BODY MASS INDEX DOCD: CPT | Performed by: FAMILY MEDICINE

## 2025-07-28 PROCEDURE — 1126F AMNT PAIN NOTED NONE PRSNT: CPT | Performed by: FAMILY MEDICINE

## 2025-07-28 ASSESSMENT — PAIN SCALES - GENERAL: PAINLEVEL_OUTOF10: 0-NO PAIN
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
## 2025-08-14 ENCOUNTER — APPOINTMENT (OUTPATIENT)
Dept: BEHAVIORAL HEALTH | Facility: CLINIC | Age: 65
End: 2025-08-14
Payer: MEDICARE

## 2025-08-14 DIAGNOSIS — F33.3 SEVERE EPISODE OF RECURRENT MAJOR DEPRESSIVE DISORDER, WITH PSYCHOTIC FEATURES (MULTI): Primary | ICD-10-CM

## 2025-08-14 PROCEDURE — 99214 OFFICE O/P EST MOD 30 MIN: CPT | Performed by: STUDENT IN AN ORGANIZED HEALTH CARE EDUCATION/TRAINING PROGRAM

## 2025-09-25 ENCOUNTER — APPOINTMENT (OUTPATIENT)
Dept: BEHAVIORAL HEALTH | Facility: CLINIC | Age: 65
End: 2025-09-25
Payer: MEDICARE

## 2025-10-29 ENCOUNTER — APPOINTMENT (OUTPATIENT)
Dept: PRIMARY CARE | Facility: CLINIC | Age: 65
End: 2025-10-29
Payer: MEDICARE